# Patient Record
Sex: FEMALE | Race: BLACK OR AFRICAN AMERICAN | Employment: PART TIME | URBAN - METROPOLITAN AREA
[De-identification: names, ages, dates, MRNs, and addresses within clinical notes are randomized per-mention and may not be internally consistent; named-entity substitution may affect disease eponyms.]

---

## 2017-12-21 ENCOUNTER — OFFICE VISIT (OUTPATIENT)
Dept: INTERNAL MEDICINE CLINIC | Age: 57
End: 2017-12-21

## 2017-12-21 VITALS
TEMPERATURE: 98 F | OXYGEN SATURATION: 98 % | RESPIRATION RATE: 16 BRPM | HEART RATE: 84 BPM | WEIGHT: 210.8 LBS | DIASTOLIC BLOOD PRESSURE: 84 MMHG | HEIGHT: 65 IN | SYSTOLIC BLOOD PRESSURE: 118 MMHG | BODY MASS INDEX: 35.12 KG/M2

## 2017-12-21 DIAGNOSIS — Z00.00 ANNUAL PHYSICAL EXAM: Primary | ICD-10-CM

## 2017-12-21 DIAGNOSIS — M79.675 GREAT TOE PAIN, LEFT: ICD-10-CM

## 2017-12-21 DIAGNOSIS — Z12.11 COLON CANCER SCREENING: ICD-10-CM

## 2017-12-21 LAB
BILIRUBIN, POC: NORMAL
BLOOD URINE, POC: NORMAL
CLARITY, POC: CLEAR
COLOR, POC: YELLOW
GLUCOSE URINE, POC: NORMAL
KETONES, POC: NORMAL
LEUKOCYTE EST, POC: NORMAL
NITRITE, POC: NORMAL
PH, POC: 5.5
PROTEIN, POC: NORMAL
SPECIFIC GRAVITY, POC: 1.01
UROBILINOGEN, POC: 0.2

## 2017-12-21 PROCEDURE — 81002 URINALYSIS NONAUTO W/O SCOPE: CPT | Performed by: INTERNAL MEDICINE

## 2017-12-21 PROCEDURE — 99396 PREV VISIT EST AGE 40-64: CPT | Performed by: INTERNAL MEDICINE

## 2017-12-21 RX ORDER — NAPROXEN 500 MG/1
500 TABLET ORAL 2 TIMES DAILY WITH MEALS
COMMUNITY
End: 2020-10-22 | Stop reason: ALTCHOICE

## 2017-12-21 ASSESSMENT — ENCOUNTER SYMPTOMS
BACK PAIN: 1
CHOKING: 1
APNEA: 1
COUGH: 1

## 2017-12-21 ASSESSMENT — PATIENT HEALTH QUESTIONNAIRE - PHQ9
SUM OF ALL RESPONSES TO PHQ9 QUESTIONS 1 & 2: 0
SUM OF ALL RESPONSES TO PHQ QUESTIONS 1-9: 0
1. LITTLE INTEREST OR PLEASURE IN DOING THINGS: 0
2. FEELING DOWN, DEPRESSED OR HOPELESS: 0

## 2017-12-21 NOTE — PROGRESS NOTES
Jose Manuel Mcdaniels   YOB: 1960    Date of Visit:  12/21/2017    Chief Complaint   Patient presents with    Annual Exam       HPI  Pt presents for annual physical exam. Last pap 2016. Lisa 2-3 times per week  Yoga 2 times per week    Has allergies. Pt takes [address] gets lodged with eating x few years. Eggs, meats beef and chicken. Doesn't eat a lot of breads    Left great toe pain for a couple of years  Has a podiatry,   sharp pain no swelling    Elbow medial epicondyle recently dull achy and intermittent thinks from carrying purse      Review of Systems   HENT: Positive for congestion, postnasal drip and sneezing. Respiratory: Positive for apnea (has CPAP but hasn't used in 1 year), cough (with sinus drainage) and choking. Musculoskeletal: Positive for arthralgias (bilateral knee pain) and back pain (right low back pain ). Right low back pain x 2 years. Low back pain dull achy and intermittent. Yoga helps. Knee pain dull achy and sharp. Pt sees Orthopedics.         Past Medical History:   Diagnosis Date    Goiter     Right ankle effusion     2014    Visual impairment     wears glasses       Past Surgical History:   Procedure Laterality Date    ANKLE FUSION Right 2014    Jeyson West    OVARY SURGERY  2016    polyps - Maria R Wright MD       Outpatient Prescriptions Marked as Taking for the 12/21/17 encounter (Office Visit) with Aarti Lawrence MD   Medication Sig Dispense Refill    naproxen (NAPROSYN) 500 MG tablet Take 500 mg by mouth 2 times daily (with meals)      b complex vitamins capsule Take by mouth      Multiple Vitamin (MULTIVITAMIN) tablet Take 1 tablet by mouth           No Known Allergies    Social History   Substance Use Topics    Smoking status: Never Smoker    Smokeless tobacco: Never Used    Alcohol use 1.2 oz/week     2 Glasses of wine per week       Family History   Problem Relation Age of Onset    Diabetes Maternal Aunt     Diabetes Maternal Uncle     Diabetes Maternal Grandmother     Stroke Maternal Grandmother     Diabetes Maternal Grandfather     Heart Disease Maternal Grandfather     High Blood Pressure Maternal Grandfather     Diabetes Maternal Cousin     Cancer Neg Hx        Immunization History   Administered Date(s) Administered    Influenza Vaccine, unspecified formulation 10/17/2017    Tdap (Boostrix, Adacel) 11/22/2016       Vitals:    12/21/17 1526   BP: 118/84   Site: Right Arm   Position: Sitting   Cuff Size: Large Adult   Pulse: 84   Resp: 16   Temp: 98 °F (36.7 °C)   TempSrc: Oral   SpO2: 98%   Weight: 210 lb 12.8 oz (95.6 kg)   Height: 5' 5\" (1.651 m)     Body mass index is 35.08 kg/m². Physical Exam      Assessment/Plan     1. Annual physical exam    - POCT urinalysis dipstick  - CBC Auto Differential; Future  - Lipid Panel; Future  - TSH without Reflex; Future  - Comprehensive Metabolic Panel; Future    2. Colon cancer screening    - POCT Fecal Immunochemical Test (FIT); Future    3. Great toe pain, left    - Uric Acid; Future  - XR FOOT LEFT (MIN 3 VIEWS); Future      Discussed medications with patient, who voiced understanding of their use and indications. All questions answered.

## 2017-12-21 NOTE — PATIENT INSTRUCTIONS
-Fast 8-10 hours for labs  -Regular aerobic exercise  -Complete FIT testing for colon cancer screening and return  -Follow up with GI regarding dysphagia (difficulty swallowing)   -Left foot xray  -Follow up with Podiatry  -Tylenol 650mg 3 times daily as needed      Patient Education        Well Visit, Women 50 to 72: Care Instructions  Your Care Instructions    Physical exams can help you stay healthy. Your doctor has checked your overall health and may have suggested ways to take good care of yourself. He or she also may have recommended tests. At home, you can help prevent illness with healthy eating, regular exercise, and other steps. Follow-up care is a key part of your treatment and safety. Be sure to make and go to all appointments, and call your doctor if you are having problems. It's also a good idea to know your test results and keep a list of the medicines you take. How can you care for yourself at home? · Reach and stay at a healthy weight. This will lower your risk for many problems, such as obesity, diabetes, heart disease, and high blood pressure. · Get at least 30 minutes of exercise on most days of the week. Walking is a good choice. You also may want to do other activities, such as running, swimming, cycling, or playing tennis or team sports. · Do not smoke. Smoking can make health problems worse. If you need help quitting, talk to your doctor about stop-smoking programs and medicines. These can increase your chances of quitting for good. · Protect your skin from too much sun. When you're outdoors from 10 a.m. to 4 p.m., stay in the shade or cover up with clothing and a hat with a wide brim. Wear sunglasses that block UV rays. Even when it's cloudy, put broad-spectrum sunscreen (SPF 30 or higher) on any exposed skin. · See a dentist one or two times a year for checkups and to have your teeth cleaned. · Wear a seat belt in the car. · Limit alcohol to 1 drink a day.  Too much alcohol can disease. You may want to have this test before age 72. · Heart attack and stroke risk. At least every 4 to 6 years, you should have your risk for heart attack and stroke assessed. Your doctor uses factors such as your age, blood pressure, cholesterol, and whether you smoke or have diabetes to show what your risk for a heart attack or stroke is over the next 10 years. When should you call for help? Watch closely for changes in your health, and be sure to contact your doctor if you have any problems or symptoms that concern you. Where can you learn more? Go to https://CHNLpejose de jesuseweb.[employer]. org and sign in to your [employer] account. Enter U056 in the ???? box to learn more about \"Well Visit, Women 50 to 72: Care Instructions. \"     If you do not have an account, please click on the \"Sign Up Now\" link. Current as of: May 12, 2017  Content Version: 11.4  © 8781-4800 [employer]. Care instructions adapted under license by [facility] ([facility]). If you have questions about a medical condition or this instruction, always ask your healthcare professional. [place] [address] any warranty or liability for your use of this information. Patient Education        Well Visit, Women 48 to 72: Care Instructions  Your Care Instructions    Physical exams can help you stay healthy. Your doctor has checked your overall health and may have suggested ways to take good care of yourself. He or she also may have recommended tests. At home, you can help prevent illness with healthy eating, regular exercise, and other steps. Follow-up care is a key part of your treatment and safety. Be sure to make and go to all appointments, and call your doctor if you are having problems. It's also a good idea to know your test results and keep a list of the medicines you take. How can you care for yourself at home? · Reach and stay at a healthy weight.  This will lower your risk for many problems, such as obesity, diabetes, heart disease, and high blood pressure. · Get at least 30 minutes of exercise on most days of the week. Walking is a good choice. You also may want to do other activities, such as running, swimming, cycling, or playing tennis or team sports. · Do not smoke. Smoking can make health problems worse. If you need help quitting, talk to your doctor about stop-smoking programs and medicines. These can increase your chances of quitting for good. · Protect your skin from too much sun. When you're outdoors from 10 a.m. to 4 p.m., stay in the shade or cover up with clothing and a hat with a wide brim. Wear sunglasses that block UV rays. Even when it's cloudy, put broad-spectrum sunscreen (SPF 30 or higher) on any exposed skin. · See a dentist one or two times a year for checkups and to have your teeth cleaned. · Wear a seat belt in the car. · Limit alcohol to 1 drink a day. Too much alcohol can cause health problems. Follow your doctor's advice about when to have certain tests. These tests can spot problems early. · Cholesterol. Your doctor will tell you how often to have this done based on your age, family history, or other things that can increase your risk for heart attack and stroke. · Blood pressure. Have your blood pressure checked during a routine doctor visit. Your doctor will tell you how often to check your blood pressure based on your age, your blood pressure results, and other factors. · Mammogram. Ask your doctor how often you should have a mammogram, which is an X-ray of your breasts. A mammogram can spot breast cancer before it can be felt and when it is easiest to treat. · Pap test and pelvic exam. Ask your doctor how often you should have a Pap test. You may not need to have a Pap test as often as you used to. · Vision. Have your eyes checked every year or two or as often as your doctor suggests.  Some experts recommend that you have yearly exams for glaucoma and other medical condition or this instruction, always ask your healthcare professional. [place] [address] any warranty or liability for your use of this information.

## 2017-12-22 ENCOUNTER — NURSE ONLY (OUTPATIENT)
Dept: INTERNAL MEDICINE CLINIC | Age: 57
End: 2017-12-22

## 2017-12-22 ENCOUNTER — HOSPITAL ENCOUNTER (OUTPATIENT)
Dept: GENERAL RADIOLOGY | Age: 57
Discharge: HOME | End: 2017-12-22
Attending: INTERNAL MEDICINE | Admitting: INTERNAL MEDICINE

## 2017-12-22 DIAGNOSIS — M79.675 GREAT TOE PAIN, LEFT: ICD-10-CM

## 2017-12-22 DIAGNOSIS — Z00.00 ANNUAL PHYSICAL EXAM: ICD-10-CM

## 2017-12-22 DIAGNOSIS — Z12.11 COLON CANCER SCREENING: ICD-10-CM

## 2017-12-22 LAB
A/G RATIO: 1.8 (ref 1.1–2.2)
ALBUMIN SERPL-MCNC: 4.4 G/DL (ref 3.4–5)
ALP BLD-CCNC: 81 U/L (ref 40–129)
ALT SERPL-CCNC: 14 U/L (ref 10–40)
ANION GAP SERPL CALCULATED.3IONS-SCNC: 13 MMOL/L (ref 3–16)
AST SERPL-CCNC: 14 U/L (ref 15–37)
BASOPHILS ABSOLUTE: 0.1 K/UL (ref 0–0.2)
BASOPHILS RELATIVE PERCENT: 1.8 %
BILIRUB SERPL-MCNC: <0.2 MG/DL (ref 0–1)
BUN BLDV-MCNC: 13 MG/DL (ref 7–20)
CALCIUM SERPL-MCNC: 9.5 MG/DL (ref 8.3–10.6)
CHLORIDE BLD-SCNC: 106 MMOL/L (ref 99–110)
CHOLESTEROL, TOTAL: 207 MG/DL (ref 0–199)
CO2: 25 MMOL/L (ref 21–32)
CONTROL: NEGATIVE
CREAT SERPL-MCNC: 0.6 MG/DL (ref 0.6–1.1)
EOSINOPHILS ABSOLUTE: 0.4 K/UL (ref 0–0.6)
EOSINOPHILS RELATIVE PERCENT: 10.9 %
GFR AFRICAN AMERICAN: >60
GFR NON-AFRICAN AMERICAN: >60
GLOBULIN: 2.4 G/DL
GLUCOSE BLD-MCNC: 97 MG/DL (ref 70–99)
HCT VFR BLD CALC: 43.4 % (ref 36–48)
HDLC SERPL-MCNC: 95 MG/DL (ref 40–60)
HEMOCCULT STL QL: NEGATIVE
HEMOGLOBIN: 14.2 G/DL (ref 12–16)
LDL CHOLESTEROL CALCULATED: 98 MG/DL
LYMPHOCYTES ABSOLUTE: 1.4 K/UL (ref 1–5.1)
LYMPHOCYTES RELATIVE PERCENT: 34.6 %
MCH RBC QN AUTO: 32.4 PG (ref 26–34)
MCHC RBC AUTO-ENTMCNC: 32.8 G/DL (ref 31–36)
MCV RBC AUTO: 98.6 FL (ref 80–100)
MONOCYTES ABSOLUTE: 0.2 K/UL (ref 0–1.3)
MONOCYTES RELATIVE PERCENT: 5.8 %
NEUTROPHILS ABSOLUTE: 1.9 K/UL (ref 1.7–7.7)
NEUTROPHILS RELATIVE PERCENT: 46.9 %
PDW BLD-RTO: 13.8 % (ref 12.4–15.4)
PLATELET # BLD: 257 K/UL (ref 135–450)
PMV BLD AUTO: 9.6 FL (ref 5–10.5)
POTASSIUM SERPL-SCNC: 4.7 MMOL/L (ref 3.5–5.1)
RBC # BLD: 4.4 M/UL (ref 4–5.2)
SODIUM BLD-SCNC: 144 MMOL/L (ref 136–145)
TOTAL PROTEIN: 6.8 G/DL (ref 6.4–8.2)
TRIGL SERPL-MCNC: 70 MG/DL (ref 0–150)
TSH SERPL DL<=0.05 MIU/L-ACNC: 1.12 UIU/ML (ref 0.27–4.2)
URIC ACID, SERUM: 4.9 MG/DL (ref 2.6–6)
VLDLC SERPL CALC-MCNC: 14 MG/DL
WBC # BLD: 4.1 K/UL (ref 4–11)

## 2017-12-22 PROCEDURE — 82274 ASSAY TEST FOR BLOOD FECAL: CPT | Performed by: INTERNAL MEDICINE

## 2018-12-31 ENCOUNTER — OFFICE VISIT (OUTPATIENT)
Dept: INTERNAL MEDICINE CLINIC | Age: 58
End: 2018-12-31
Payer: COMMERCIAL

## 2018-12-31 VITALS
OXYGEN SATURATION: 97 % | DIASTOLIC BLOOD PRESSURE: 86 MMHG | WEIGHT: 217 LBS | RESPIRATION RATE: 14 BRPM | HEIGHT: 65 IN | HEART RATE: 91 BPM | SYSTOLIC BLOOD PRESSURE: 122 MMHG | TEMPERATURE: 98 F | BODY MASS INDEX: 36.15 KG/M2

## 2018-12-31 DIAGNOSIS — Z23 NEED FOR SHINGLES VACCINE: ICD-10-CM

## 2018-12-31 DIAGNOSIS — J30.9 ALLERGIC RHINITIS, UNSPECIFIED SEASONALITY, UNSPECIFIED TRIGGER: ICD-10-CM

## 2018-12-31 DIAGNOSIS — Z00.00 ANNUAL PHYSICAL EXAM: Primary | ICD-10-CM

## 2018-12-31 DIAGNOSIS — E66.9 OBESITY (BMI 35.0-39.9 WITHOUT COMORBIDITY): ICD-10-CM

## 2018-12-31 LAB
BILIRUBIN, POC: NORMAL
BLOOD URINE, POC: NORMAL
CLARITY, POC: CLEAR
COLOR, POC: YELLOW
GLUCOSE URINE, POC: NORMAL
KETONES, POC: NORMAL
LEUKOCYTE EST, POC: NORMAL
NITRITE, POC: NORMAL
PH, POC: 5
PROTEIN, POC: NORMAL
SPECIFIC GRAVITY, POC: 1.02
UROBILINOGEN, POC: 0.2

## 2018-12-31 PROCEDURE — 99396 PREV VISIT EST AGE 40-64: CPT | Performed by: INTERNAL MEDICINE

## 2018-12-31 PROCEDURE — 81002 URINALYSIS NONAUTO W/O SCOPE: CPT | Performed by: INTERNAL MEDICINE

## 2018-12-31 ASSESSMENT — PATIENT HEALTH QUESTIONNAIRE - PHQ9
2. FEELING DOWN, DEPRESSED OR HOPELESS: 0
SUM OF ALL RESPONSES TO PHQ QUESTIONS 1-9: 0
SUM OF ALL RESPONSES TO PHQ9 QUESTIONS 1 & 2: 0
1. LITTLE INTEREST OR PLEASURE IN DOING THINGS: 0
SUM OF ALL RESPONSES TO PHQ QUESTIONS 1-9: 0

## 2018-12-31 NOTE — PROGRESS NOTES
Stroke Maternal Grandmother     Diabetes Maternal Grandfather     Heart Disease Maternal Grandfather     High Blood Pressure Maternal Grandfather     Diabetes Maternal Cousin     Cancer Neg Hx        Immunization History   Administered Date(s) Administered    Influenza Vaccine, unspecified formulation 10/17/2017    Influenza Virus Vaccine 10/05/2018    Tdap (Boostrix, Adacel) 11/22/2016       Vitals:    12/31/18 1302   BP: 122/86   Site: Right Upper Arm   Position: Sitting   Cuff Size: Large Adult   Pulse: 91   Resp: 14   Temp: 98 °F (36.7 °C)   TempSrc: Oral   SpO2: 97%   Weight: 217 lb (98.4 kg)   Height: 5' 5.25\" (1.657 m)     Body mass index is 35.83 kg/m². Physical Exam   Constitutional: She is oriented to person, place, and time. She appears well-developed and well-nourished. No distress. HENT:   Head: Normocephalic and atraumatic. Right Ear: Hearing, tympanic membrane and ear canal normal.   Left Ear: Hearing, tympanic membrane and ear canal normal.   Nose: Nose normal.   Mouth/Throat: Uvula is midline, oropharynx is clear and moist and mucous membranes are normal.   Eyes: Pupils are equal, round, and reactive to light. Conjunctivae, EOM and lids are normal.   Neck: Neck supple. Carotid bruit is not present. No thyromegaly present. Cardiovascular: Normal rate, regular rhythm, S1 normal, S2 normal, normal heart sounds and intact distal pulses. Exam reveals no gallop and no friction rub. No murmur heard. Pulmonary/Chest: Effort normal and breath sounds normal. No respiratory distress. She has no wheezes. She has no rhonchi. She has no rales. Abdominal: Soft. Bowel sounds are normal. She exhibits no distension and no mass. There is no hepatosplenomegaly. There is no tenderness. There is no rebound. Genitourinary:   Genitourinary Comments: deferred   Musculoskeletal: Normal range of motion. She exhibits no edema.         Right shoulder: She exhibits normal range of motion and no

## 2018-12-31 NOTE — PATIENT INSTRUCTIONS
early.  · Cholesterol. Your doctor will tell you how often to have this done based on your age, family history, or other things that can increase your risk for heart attack and stroke. · Blood pressure. Have your blood pressure checked during a routine doctor visit. Your doctor will tell you how often to check your blood pressure based on your age, your blood pressure results, and other factors. · Mammogram. Ask your doctor how often you should have a mammogram, which is an X-ray of your breasts. A mammogram can spot breast cancer before it can be felt and when it is easiest to treat. · Pap test and pelvic exam. Ask your doctor how often you should have a Pap test. You may not need to have a Pap test as often as you used to. · Vision. Have your eyes checked every year or two or as often as your doctor suggests. Some experts recommend that you have yearly exams for glaucoma and other age-related eye problems starting at age 48. · Hearing. Tell your doctor if you notice any change in your hearing. You can have tests to find out how well you hear. · Diabetes. Ask your doctor whether you should have tests for diabetes. · Colon cancer. You should begin tests for colon cancer at age 48. You may have one of several tests. Your doctor will tell you how often to have tests based on your age and risk. Risks include whether you already had a precancerous polyp removed from your colon or whether your parents, sisters and brothers, or children have had colon cancer. · Thyroid disease. Talk to your doctor about whether to have your thyroid checked as part of a regular physical exam. Women have an increased chance of a thyroid problem. · Osteoporosis. You should begin tests for bone density at age 72. If you are younger than 72, ask your doctor whether you have factors that may increase your risk for this disease. You may want to have this test before age 72. · Heart attack and stroke risk.  At least every 4 to 6 years, professional. [address] any warranty or liability for your use of this information.

## 2019-01-15 ENCOUNTER — TELEPHONE (OUTPATIENT)
Dept: BARIATRICS/WEIGHT MGMT | Age: 59
End: 2019-01-15

## 2019-03-18 ENCOUNTER — NURSE ONLY (OUTPATIENT)
Dept: INTERNAL MEDICINE CLINIC | Age: 59
End: 2019-03-18
Payer: COMMERCIAL

## 2019-03-18 DIAGNOSIS — Z11.1 PPD SCREENING TEST: Primary | ICD-10-CM

## 2019-03-18 PROCEDURE — 86580 TB INTRADERMAL TEST: CPT | Performed by: INTERNAL MEDICINE

## 2019-03-20 LAB
INDURATION: NORMAL
TB SKIN TEST: NEGATIVE

## 2019-12-23 ENCOUNTER — OFFICE VISIT (OUTPATIENT)
Dept: PRIMARY CARE CLINIC | Age: 59
End: 2019-12-23
Payer: COMMERCIAL

## 2019-12-23 VITALS
TEMPERATURE: 98.1 F | HEART RATE: 68 BPM | OXYGEN SATURATION: 99 % | BODY MASS INDEX: 38.2 KG/M2 | SYSTOLIC BLOOD PRESSURE: 120 MMHG | DIASTOLIC BLOOD PRESSURE: 78 MMHG | RESPIRATION RATE: 14 BRPM | WEIGHT: 215.6 LBS | HEIGHT: 63 IN

## 2019-12-23 DIAGNOSIS — R09.82 POSTNASAL DRIP: ICD-10-CM

## 2019-12-23 DIAGNOSIS — Z00.00 ANNUAL PHYSICAL EXAM: ICD-10-CM

## 2019-12-23 DIAGNOSIS — Z23 NEED FOR INFLUENZA VACCINATION: ICD-10-CM

## 2019-12-23 DIAGNOSIS — R05.9 COUGH: ICD-10-CM

## 2019-12-23 DIAGNOSIS — Z00.00 ANNUAL PHYSICAL EXAM: Primary | ICD-10-CM

## 2019-12-23 LAB
A/G RATIO: 1.8 (ref 1.1–2.2)
ALBUMIN SERPL-MCNC: 4.4 G/DL (ref 3.4–5)
ALP BLD-CCNC: 80 U/L (ref 40–129)
ALT SERPL-CCNC: 13 U/L (ref 10–40)
ANION GAP SERPL CALCULATED.3IONS-SCNC: 12 MMOL/L (ref 3–16)
AST SERPL-CCNC: 14 U/L (ref 15–37)
BASOPHILS ABSOLUTE: 0.1 K/UL (ref 0–0.2)
BASOPHILS RELATIVE PERCENT: 1.4 %
BILIRUB SERPL-MCNC: 0.3 MG/DL (ref 0–1)
BILIRUBIN URINE: NEGATIVE
BLOOD, URINE: NEGATIVE
BUN BLDV-MCNC: 11 MG/DL (ref 7–20)
CALCIUM SERPL-MCNC: 9.7 MG/DL (ref 8.3–10.6)
CHLORIDE BLD-SCNC: 102 MMOL/L (ref 99–110)
CHOLESTEROL, TOTAL: 216 MG/DL (ref 0–199)
CLARITY: CLEAR
CO2: 28 MMOL/L (ref 21–32)
COLOR: YELLOW
CREAT SERPL-MCNC: 0.7 MG/DL (ref 0.6–1.1)
EOSINOPHILS ABSOLUTE: 0.3 K/UL (ref 0–0.6)
EOSINOPHILS RELATIVE PERCENT: 6.3 %
GFR AFRICAN AMERICAN: >60
GFR NON-AFRICAN AMERICAN: >60
GLOBULIN: 2.4 G/DL
GLUCOSE BLD-MCNC: 94 MG/DL (ref 70–99)
GLUCOSE URINE: NEGATIVE MG/DL
HCT VFR BLD CALC: 42.5 % (ref 36–48)
HDLC SERPL-MCNC: 85 MG/DL (ref 40–60)
HEMOGLOBIN: 14 G/DL (ref 12–16)
KETONES, URINE: NEGATIVE MG/DL
LDL CHOLESTEROL CALCULATED: 117 MG/DL
LEUKOCYTE ESTERASE, URINE: NEGATIVE
LYMPHOCYTES ABSOLUTE: 1.7 K/UL (ref 1–5.1)
LYMPHOCYTES RELATIVE PERCENT: 40.2 %
MCH RBC QN AUTO: 31.7 PG (ref 26–34)
MCHC RBC AUTO-ENTMCNC: 33 G/DL (ref 31–36)
MCV RBC AUTO: 96.2 FL (ref 80–100)
MICROSCOPIC EXAMINATION: NORMAL
MONOCYTES ABSOLUTE: 0.3 K/UL (ref 0–1.3)
MONOCYTES RELATIVE PERCENT: 7.6 %
NEUTROPHILS ABSOLUTE: 1.9 K/UL (ref 1.7–7.7)
NEUTROPHILS RELATIVE PERCENT: 44.5 %
NITRITE, URINE: NEGATIVE
PDW BLD-RTO: 13.2 % (ref 12.4–15.4)
PH UA: 5 (ref 5–8)
PLATELET # BLD: 254 K/UL (ref 135–450)
PMV BLD AUTO: 9.3 FL (ref 5–10.5)
POTASSIUM SERPL-SCNC: 3.9 MMOL/L (ref 3.5–5.1)
PROTEIN UA: NEGATIVE MG/DL
RBC # BLD: 4.42 M/UL (ref 4–5.2)
SODIUM BLD-SCNC: 142 MMOL/L (ref 136–145)
SPECIFIC GRAVITY UA: 1.01 (ref 1–1.03)
TOTAL PROTEIN: 6.8 G/DL (ref 6.4–8.2)
TRIGL SERPL-MCNC: 69 MG/DL (ref 0–150)
TSH SERPL DL<=0.05 MIU/L-ACNC: 1.14 UIU/ML (ref 0.27–4.2)
URINE TYPE: NORMAL
UROBILINOGEN, URINE: 0.2 E.U./DL
VLDLC SERPL CALC-MCNC: 14 MG/DL
WBC # BLD: 4.3 K/UL (ref 4–11)

## 2019-12-23 PROCEDURE — 81003 URINALYSIS AUTO W/O SCOPE: CPT | Performed by: INTERNAL MEDICINE

## 2019-12-23 PROCEDURE — 90471 IMMUNIZATION ADMIN: CPT | Performed by: INTERNAL MEDICINE

## 2019-12-23 PROCEDURE — 99396 PREV VISIT EST AGE 40-64: CPT | Performed by: INTERNAL MEDICINE

## 2019-12-23 PROCEDURE — 90686 IIV4 VACC NO PRSV 0.5 ML IM: CPT | Performed by: INTERNAL MEDICINE

## 2019-12-23 PROCEDURE — G8482 FLU IMMUNIZE ORDER/ADMIN: HCPCS | Performed by: INTERNAL MEDICINE

## 2019-12-23 RX ORDER — BENZONATATE 100 MG/1
100 CAPSULE ORAL 3 TIMES DAILY PRN
Qty: 30 CAPSULE | Refills: 0 | Status: SHIPPED | OUTPATIENT
Start: 2019-12-23 | End: 2019-12-30

## 2019-12-23 RX ORDER — LORATADINE 10 MG/1
10 TABLET ORAL DAILY
Qty: 30 TABLET | Refills: 1 | Status: SHIPPED | OUTPATIENT
Start: 2019-12-23 | End: 2020-02-17 | Stop reason: ALTCHOICE

## 2019-12-23 ASSESSMENT — ENCOUNTER SYMPTOMS
PHOTOPHOBIA: 0
CONSTIPATION: 0
WHEEZING: 0
SINUS PAIN: 0
SORE THROAT: 0
RHINORRHEA: 0
VOICE CHANGE: 0
NAUSEA: 0
DIARRHEA: 0
COUGH: 1
EYE ITCHING: 0
SHORTNESS OF BREATH: 0
FACIAL SWELLING: 0
BLOOD IN STOOL: 0
EYE DISCHARGE: 0
ABDOMINAL DISTENTION: 0
VOMITING: 0
BACK PAIN: 0
EYE PAIN: 0
APNEA: 0
ABDOMINAL PAIN: 0
TROUBLE SWALLOWING: 0
EYE REDNESS: 0
ANAL BLEEDING: 0
RECTAL PAIN: 0
SINUS PRESSURE: 0
CHOKING: 0
CHEST TIGHTNESS: 0

## 2019-12-23 ASSESSMENT — PATIENT HEALTH QUESTIONNAIRE - PHQ9
1. LITTLE INTEREST OR PLEASURE IN DOING THINGS: 0
2. FEELING DOWN, DEPRESSED OR HOPELESS: 0
SUM OF ALL RESPONSES TO PHQ QUESTIONS 1-9: 0
SUM OF ALL RESPONSES TO PHQ QUESTIONS 1-9: 0
SUM OF ALL RESPONSES TO PHQ9 QUESTIONS 1 & 2: 0

## 2019-12-30 ENCOUNTER — TELEPHONE (OUTPATIENT)
Dept: PRIMARY CARE CLINIC | Age: 59
End: 2019-12-30

## 2019-12-30 NOTE — TELEPHONE ENCOUNTER
Patient is currently scheduled on 01/10/2020 for her follow up appointment on congestion and cough. Patient stated the symptoms have gotten worse since she was in the office on 12/23/2019.  Would like to know if Dr. Chanelle Moseley would like to see her sooner or can a medication be prescribed to take before the follow up appointment

## 2020-01-10 ENCOUNTER — OFFICE VISIT (OUTPATIENT)
Dept: PRIMARY CARE CLINIC | Age: 60
End: 2020-01-10
Payer: COMMERCIAL

## 2020-01-10 ENCOUNTER — HOSPITAL ENCOUNTER (OUTPATIENT)
Dept: GENERAL RADIOLOGY | Age: 60
Discharge: HOME | End: 2020-01-10
Payer: COMMERCIAL

## 2020-01-10 ENCOUNTER — HOSPITAL ENCOUNTER (OUTPATIENT)
Age: 60
Discharge: HOME | End: 2020-01-10
Payer: COMMERCIAL

## 2020-01-10 VITALS
WEIGHT: 215 LBS | TEMPERATURE: 98.4 F | SYSTOLIC BLOOD PRESSURE: 122 MMHG | BODY MASS INDEX: 35.82 KG/M2 | OXYGEN SATURATION: 98 % | HEIGHT: 65 IN | HEART RATE: 76 BPM | DIASTOLIC BLOOD PRESSURE: 78 MMHG

## 2020-01-10 PROCEDURE — G8482 FLU IMMUNIZE ORDER/ADMIN: HCPCS | Performed by: INTERNAL MEDICINE

## 2020-01-10 PROCEDURE — 3017F COLORECTAL CA SCREEN DOC REV: CPT | Performed by: INTERNAL MEDICINE

## 2020-01-10 PROCEDURE — 1036F TOBACCO NON-USER: CPT | Performed by: INTERNAL MEDICINE

## 2020-01-10 PROCEDURE — 71046 X-RAY EXAM CHEST 2 VIEWS: CPT

## 2020-01-10 PROCEDURE — 99213 OFFICE O/P EST LOW 20 MIN: CPT | Performed by: INTERNAL MEDICINE

## 2020-01-10 PROCEDURE — G8427 DOCREV CUR MEDS BY ELIG CLIN: HCPCS | Performed by: INTERNAL MEDICINE

## 2020-01-10 PROCEDURE — G8417 CALC BMI ABV UP PARAM F/U: HCPCS | Performed by: INTERNAL MEDICINE

## 2020-01-10 RX ORDER — OMEPRAZOLE 20 MG/1
20 CAPSULE, DELAYED RELEASE ORAL
Qty: 30 CAPSULE | Refills: 1 | Status: SHIPPED | OUTPATIENT
Start: 2020-01-10 | End: 2020-02-17 | Stop reason: SDUPTHER

## 2020-01-10 RX ORDER — AZITHROMYCIN 250 MG/1
TABLET, FILM COATED ORAL
Qty: 1 PACKET | Refills: 0 | Status: SHIPPED | OUTPATIENT
Start: 2020-01-10 | End: 2020-01-20

## 2020-01-10 RX ORDER — BROMPHENIRAMINE MALEATE, PSEUDOEPHEDRINE HYDROCHLORIDE, AND DEXTROMETHORPHAN HYDROBROMIDE 2; 30; 10 MG/5ML; MG/5ML; MG/5ML
5 SYRUP ORAL 4 TIMES DAILY PRN
Qty: 120 ML | Refills: 0 | Status: SHIPPED | OUTPATIENT
Start: 2020-01-10 | End: 2020-02-17 | Stop reason: ALTCHOICE

## 2020-01-10 RX ORDER — ALBUTEROL SULFATE 90 UG/1
2 AEROSOL, METERED RESPIRATORY (INHALATION) EVERY 6 HOURS PRN
Qty: 1 INHALER | Refills: 0 | Status: SHIPPED | OUTPATIENT
Start: 2020-01-10 | End: 2020-02-17 | Stop reason: ALTCHOICE

## 2020-01-10 ASSESSMENT — ENCOUNTER SYMPTOMS
COUGH: 1
SHORTNESS OF BREATH: 1
BLOOD IN STOOL: 0
NAUSEA: 0
SINUS PAIN: 0
VOMITING: 0
ABDOMINAL PAIN: 0
WHEEZING: 1
CHEST TIGHTNESS: 0
TROUBLE SWALLOWING: 0
SINUS PRESSURE: 0
RHINORRHEA: 0
SORE THROAT: 0

## 2020-01-10 ASSESSMENT — PATIENT HEALTH QUESTIONNAIRE - PHQ9
SUM OF ALL RESPONSES TO PHQ9 QUESTIONS 1 & 2: 0
SUM OF ALL RESPONSES TO PHQ QUESTIONS 1-9: 0
SUM OF ALL RESPONSES TO PHQ QUESTIONS 1-9: 0
1. LITTLE INTEREST OR PLEASURE IN DOING THINGS: 0
2. FEELING DOWN, DEPRESSED OR HOPELESS: 0

## 2020-01-10 NOTE — PATIENT INSTRUCTIONS
symptoms. Follow-up care is a key part of your treatment and safety. Be sure to make and go to all appointments, and call your doctor if you are having problems. It's also a good idea to know your test results and keep a list of the medicines you take. How can you care for yourself at home? · Take your medicines exactly as prescribed. Call your doctor if you think you are having a problem with your medicine. · Your doctor may recommend over-the-counter medicine. For mild or occasional indigestion, antacids, such as Tums, Gaviscon, Mylanta, or Maalox, may help. Your doctor also may recommend over-the-counter acid reducers, such as Pepcid AC, Tagamet HB, Zantac 75, or Prilosec. Read and follow all instructions on the label. If you use these medicines often, talk with your doctor. · Change your eating habits. ? It's best to eat several small meals instead of two or three large meals. ? After you eat, wait 2 to 3 hours before you lie down. ? Chocolate, mint, and alcohol can make GERD worse. ? Spicy foods, foods that have a lot of acid (like tomatoes and oranges), and coffee can make GERD symptoms worse in some people. If your symptoms are worse after you eat a certain food, you may want to stop eating that food to see if your symptoms get better. · Do not smoke or chew tobacco. Smoking can make GERD worse. If you need help quitting, talk to your doctor about stop-smoking programs and medicines. These can increase your chances of quitting for good. · If you have GERD symptoms at night, raise the head of your bed 6 to 8 inches by putting the frame on blocks or placing a foam wedge under the head of your mattress. (Adding extra pillows does not work.)  · Do not wear tight clothing around your middle. · Lose weight if you need to. Losing just 5 to 10 pounds can help. When should you call for help?   Call your doctor now or seek immediate medical care if:    · You have new or different belly pain.     · Your stools are black and tarlike or have streaks of blood.    Watch closely for changes in your health, and be sure to contact your doctor if:    · Your symptoms have not improved after 2 days.     · Food seems to catch in your throat or chest.   Where can you learn more? Go to https://chpepiceweb.imbookin ([employer]). org and sign in to your [employer] account. Enter P318 in the [employer] box to learn more about \"Gastroesophageal Reflux Disease (GERD): Care Instructions. \"     If you do not have an account, please click on the \"Sign Up Now\" link. Current as of: August 11, 2019  Content Version: 12.3  © 3981-5350 [employer]. Care instructions adapted under license by [facility] [address] If you have questions about a medical condition or this instruction, always ask your healthcare professional. [address] any warranty or liability for your use of this information.

## 2020-01-10 NOTE — PROGRESS NOTES
John Lau   Date ofBirth:  1960    Date of Visit:  1/10/2020    Chief Complaint   Patient presents with    Cough    Congestion       HPI  Pt c/o 2 week+ h/o productive cough with green phlegm that clearer, coughing spells, post nasal drainage, stuffy nose, little wheezing, SOB, headache, and ears popping. Pt states Tessalon Perles and Loratadine did not help. Pt c/o heartburn and reflux x 2 years. Pt is not taking any medication. Pt states she has cut out spicy foods, tomato type dishes,   Drinks 2 cups of coffee in the morning. Pt has heartburn and reflux recently. Review of Systems   Constitutional: Negative for chills and fever. HENT: Positive for congestion and postnasal drip. Negative for ear pain, rhinorrhea, sinus pressure, sinus pain, sneezing, sore throat and trouble swallowing. Respiratory: Positive for cough, shortness of breath and wheezing. Negative for chest tightness. Cardiovascular: Negative for chest pain. Gastrointestinal: Negative for abdominal pain, blood in stool, nausea and vomiting. Neurological: Positive for headaches.        No Known Allergies  Outpatient Medications Marked as Taking for the 1/10/20 encounter (Office Visit) with Getachew Fowler MD   Medication Sig Dispense Refill    loratadine (CLARITIN) 10 MG tablet Take 1 tablet by mouth daily 30 tablet 1    zoster recombinant adjuvanted vaccine [facility]) 50 MCG/0.5ML SUSR injection Inject 0.5 mLs into the muscle See Admin Instructions 1 dose now and repeat in 2-6 months 0.5 mL 1    naproxen (NAPROSYN) 500 MG tablet Take 500 mg by mouth 2 times daily (with meals)      b complex vitamins capsule Take by mouth      vitamin D 1000 UNITS CAPS Take by mouth      fexofenadine (ALLEGRA) 180 MG tablet Take 180 mg by mouth      Multiple Vitamin (MULTIVITAMIN) tablet Take 1 tablet by mouth           Vitals:    01/10/20 0808   BP: 122/78   Site: Right Upper Arm   Position: Sitting   Cuff Size: Medium Adult 2. 4     WBC 12/23/2019 4.3     RBC 12/23/2019 4.42     Hemoglobin 12/23/2019 14.0     Hematocrit 12/23/2019 42.5     MCV 12/23/2019 96.2     MCH 12/23/2019 31.7     MCHC 12/23/2019 33.0     RDW 12/23/2019 13.2     Platelets 01/21/2942 254     MPV 12/23/2019 9.3     Neutrophils % 12/23/2019 44.5     Lymphocytes % 12/23/2019 40.2     Monocytes % 12/23/2019 7.6     Eosinophils % 12/23/2019 6.3     Basophils % 12/23/2019 1.4     Neutrophils Absolute 12/23/2019 1.9     Lymphocytes Absolute 12/23/2019 1.7     Monocytes Absolute 12/23/2019 0.3     Eosinophils Absolute 12/23/2019 0.3     Basophils Absolute 12/23/2019 0.1    Office Visit on 12/23/2019   Component Date Value    Color, UA 12/23/2019 YELLOW     Clarity, UA 12/23/2019 Clear     Glucose, Ur 12/23/2019 Negative     Bilirubin Urine 12/23/2019 Negative     Ketones, Urine 12/23/2019 Negative     Specific Gravity, UA 12/23/2019 1.010     Blood, Urine 12/23/2019 Negative     pH, UA 12/23/2019 5.0     Protein, UA 12/23/2019 Negative     Urobilinogen, Urine 12/23/2019 0.2     Nitrite, Urine 12/23/2019 Negative     Leukocyte Esterase, Urine 12/23/2019 Negative     Microscopic Examination 12/23/2019 Not Indicated     Urine Type 12/23/2019 Cleancatch          Assessment/Plan     1. Upper respiratory tract infection, unspecified type  - azithromycin (ZITHROMAX) 250 MG tablet; Take 2 tabs (500 mg) on Day 1, and take 1 tab (250 mg) on days 2 through 5. Dispense: 1 packet; Refill: 0  - brompheniramine-pseudoephedrine-DM (BROMFED DM) 2-30-10 MG/5ML syrup; Take 5 mLs by mouth 4 times daily as needed for Congestion or Cough  Dispense: 120 mL; Refill: 0    2. Bronchitis  - azithromycin (ZITHROMAX) 250 MG tablet; Take 2 tabs (500 mg) on Day 1, and take 1 tab (250 mg) on days 2 through 5. Dispense: 1 packet; Refill: 0  - brompheniramine-pseudoephedrine-DM (BROMFED DM) 2-30-10 MG/5ML syrup;  Take 5 mLs by mouth 4 times daily as needed for Congestion or Cough  Dispense: 120 mL; Refill: 0  - albuterol sulfate HFA (PROAIR HFA) 108 (90 Base) MCG/ACT inhaler; Inhale 2 puffs into the lungs every 6 hours as needed for Wheezing  Dispense: 1 Inhaler; Refill: 0  - XR CHEST STANDARD (2 VW); Future    3. Gastroesophageal reflux disease, esophagitis presence not specified  - omeprazole (PRILOSEC) 20 MG delayed release capsule; Take 1 capsule by mouth every morning (before breakfast)  Dispense: 30 capsule; Refill: 1  -Decrease caffeine, avoid spicy foods, avoid tomato based foods  -Eat small meals instead of large meals  -Wait 2-3 hours after eating before lying down        Discussed medications with patient, who voiced understanding of their use and indications. All questions answered. Return in about 4 weeks (around 2/7/2020) for cough and GERD.

## 2020-01-22 PROBLEM — R05.9 COUGH: Status: RESOLVED | Noted: 2019-12-23 | Resolved: 2020-01-22

## 2020-02-17 ENCOUNTER — OFFICE VISIT (OUTPATIENT)
Dept: PRIMARY CARE CLINIC | Age: 60
End: 2020-02-17
Payer: COMMERCIAL

## 2020-02-17 VITALS
DIASTOLIC BLOOD PRESSURE: 80 MMHG | SYSTOLIC BLOOD PRESSURE: 136 MMHG | HEART RATE: 59 BPM | WEIGHT: 213.6 LBS | BODY MASS INDEX: 35.59 KG/M2 | OXYGEN SATURATION: 99 % | HEIGHT: 65 IN

## 2020-02-17 PROCEDURE — 1036F TOBACCO NON-USER: CPT | Performed by: INTERNAL MEDICINE

## 2020-02-17 PROCEDURE — G8482 FLU IMMUNIZE ORDER/ADMIN: HCPCS | Performed by: INTERNAL MEDICINE

## 2020-02-17 PROCEDURE — G8417 CALC BMI ABV UP PARAM F/U: HCPCS | Performed by: INTERNAL MEDICINE

## 2020-02-17 PROCEDURE — 99213 OFFICE O/P EST LOW 20 MIN: CPT | Performed by: INTERNAL MEDICINE

## 2020-02-17 PROCEDURE — G8427 DOCREV CUR MEDS BY ELIG CLIN: HCPCS | Performed by: INTERNAL MEDICINE

## 2020-02-17 PROCEDURE — 3017F COLORECTAL CA SCREEN DOC REV: CPT | Performed by: INTERNAL MEDICINE

## 2020-02-17 RX ORDER — OMEPRAZOLE 20 MG/1
20 CAPSULE, DELAYED RELEASE ORAL
Qty: 30 CAPSULE | Refills: 5 | Status: SHIPPED | OUTPATIENT
Start: 2020-02-17 | End: 2020-10-22 | Stop reason: ALTCHOICE

## 2020-02-17 SDOH — ECONOMIC STABILITY: FOOD INSECURITY: WITHIN THE PAST 12 MONTHS, THE FOOD YOU BOUGHT JUST DIDN'T LAST AND YOU DIDN'T HAVE MONEY TO GET MORE.: NEVER TRUE

## 2020-02-17 SDOH — ECONOMIC STABILITY: FOOD INSECURITY: WITHIN THE PAST 12 MONTHS, YOU WORRIED THAT YOUR FOOD WOULD RUN OUT BEFORE YOU GOT MONEY TO BUY MORE.: NEVER TRUE

## 2020-02-17 SDOH — ECONOMIC STABILITY: TRANSPORTATION INSECURITY
IN THE PAST 12 MONTHS, HAS THE LACK OF TRANSPORTATION KEPT YOU FROM MEDICAL APPOINTMENTS OR FROM GETTING MEDICATIONS?: NO

## 2020-02-17 SDOH — ECONOMIC STABILITY: INCOME INSECURITY: HOW HARD IS IT FOR YOU TO PAY FOR THE VERY BASICS LIKE FOOD, HOUSING, MEDICAL CARE, AND HEATING?: NOT HARD AT ALL

## 2020-02-17 SDOH — ECONOMIC STABILITY: TRANSPORTATION INSECURITY
IN THE PAST 12 MONTHS, HAS LACK OF TRANSPORTATION KEPT YOU FROM MEETINGS, WORK, OR FROM GETTING THINGS NEEDED FOR DAILY LIVING?: NO

## 2020-02-17 NOTE — PROGRESS NOTES
Valerie Dixon   Date ofBirth:  1960    Date of Visit:  2/17/2020    Chief Complaint   Patient presents with    Cough    Gastroesophageal Reflux       HPI  GERD- Pt states Omeprazole helps reflux. Pt denies heartburn and reflux. Pt decreases spicy foods and tomato based foods. Pt drinks up to 2 cups of coffee max per day. Cough- Pt states she clears throat a lot. Pt states she has a tickle causing cough from throat. Pt states she carries cough drops and drinks a lot of water. Review of Systems   Constitutional: Negative for chills and fever. HENT: Negative for congestion, ear pain, postnasal drip, rhinorrhea, sneezing, sore throat and trouble swallowing. Respiratory: Positive for cough. Negative for chest tightness, shortness of breath and wheezing. Cardiovascular: Negative for chest pain. Gastrointestinal: Negative for abdominal pain, blood in stool, nausea and vomiting. Neurological: Negative for headaches. No Known Allergies  Outpatient Medications Marked as Taking for the 2/17/20 encounter (Office Visit) with Chema Gutierrez MD   Medication Sig Dispense Refill    omeprazole (PRILOSEC) 20 MG delayed release capsule Take 1 capsule by mouth every morning (before breakfast) 30 capsule 1    zoster recombinant adjuvanted vaccine (SHINGRIX) 50 MCG/0.5ML SUSR injection Inject 0.5 mLs into the muscle See Admin Instructions 1 dose now and repeat in 2-6 months 0.5 mL 1    naproxen (NAPROSYN) 500 MG tablet Take 500 mg by mouth 2 times daily (with meals)      b complex vitamins capsule Take by mouth      vitamin D 1000 UNITS CAPS Take by mouth      Multiple Vitamin (MULTIVITAMIN) tablet Take 1 tablet by mouth           Vitals:    02/17/20 1519   BP: 136/80   Site: Right Upper Arm   Position: Sitting   Cuff Size: Medium Adult   Pulse: 59   SpO2: 99%   Weight: 213 lb 9.6 oz (96.9 kg)   Height: 5' 5.25\" (1.657 m)     Body mass index is 35.27 kg/m².      Physical Exam  Nursing note reviewed. Constitutional:       General: She is not in acute distress. Appearance: She is well-developed. HENT:      Right Ear: Tympanic membrane and ear canal normal.      Left Ear: Tympanic membrane and ear canal normal.      Nose:      Right Sinus: No maxillary sinus tenderness. Left Sinus: No maxillary sinus tenderness. Mouth/Throat:      Pharynx: Oropharynx is clear. Eyes:      General: Lids are normal.      Extraocular Movements: Extraocular movements intact. Conjunctiva/sclera: Conjunctivae normal.      Pupils: Pupils are equal, round, and reactive to light. Cardiovascular:      Rate and Rhythm: Normal rate and regular rhythm. Heart sounds: Normal heart sounds. No murmur. Pulmonary:      Effort: Pulmonary effort is normal. No respiratory distress. Breath sounds: Normal breath sounds. No wheezing, rhonchi or rales. Abdominal:      General: Bowel sounds are normal. There is no distension. Palpations: Abdomen is soft. Tenderness: There is no abdominal tenderness. No results found for this visit on 02/17/20.   Lab Review   Orders Only on 12/23/2019   Component Date Value    Cholesterol, Total 12/23/2019 216*    Triglycerides 12/23/2019 69     HDL 12/23/2019 85*    LDL Calculated 12/23/2019 117*    VLDL Cholesterol Calcula* 12/23/2019 14     TSH 12/23/2019 1.14     Sodium 12/23/2019 142     Potassium 12/23/2019 3.9     Chloride 12/23/2019 102     CO2 12/23/2019 28     Anion Gap 12/23/2019 12     Glucose 12/23/2019 94     BUN 12/23/2019 11     CREATININE 12/23/2019 0.7     GFR Non- 12/23/2019 >60     GFR  12/23/2019 >60     Calcium 12/23/2019 9.7     Total Protein 12/23/2019 6.8     Alb 12/23/2019 4.4     Albumin/Globulin Ratio 12/23/2019 1.8     Total Bilirubin 12/23/2019 0.3     Alkaline Phosphatase 12/23/2019 80     ALT 12/23/2019 13     AST 12/23/2019 14*    Globulin 12/23/2019 2.4     WBC 12/23/2019 4.3     RBC 12/23/2019 4.42     Hemoglobin 12/23/2019 14.0     Hematocrit 12/23/2019 42.5     MCV 12/23/2019 96.2     MCH 12/23/2019 31.7     MCHC 12/23/2019 33.0     RDW 12/23/2019 13.2     Platelets 87/41/8043 254     MPV 12/23/2019 9.3     Neutrophils % 12/23/2019 44.5     Lymphocytes % 12/23/2019 40.2     Monocytes % 12/23/2019 7.6     Eosinophils % 12/23/2019 6.3     Basophils % 12/23/2019 1.4     Neutrophils Absolute 12/23/2019 1.9     Lymphocytes Absolute 12/23/2019 1.7     Monocytes Absolute 12/23/2019 0.3     Eosinophils Absolute 12/23/2019 0.3     Basophils Absolute 12/23/2019 0.1    Office Visit on 12/23/2019   Component Date Value    Color, UA 12/23/2019 YELLOW     Clarity, UA 12/23/2019 Clear     Glucose, Ur 12/23/2019 Negative     Bilirubin Urine 12/23/2019 Negative     Ketones, Urine 12/23/2019 Negative     Specific Gravity, UA 12/23/2019 1.010     Blood, Urine 12/23/2019 Negative     pH, UA 12/23/2019 5.0     Protein, UA 12/23/2019 Negative     Urobilinogen, Urine 12/23/2019 0.2     Nitrite, Urine 12/23/2019 Negative     Leukocyte Esterase, Urine 12/23/2019 Negative     Microscopic Examination 12/23/2019 Not Indicated     Urine Type 12/23/2019 Cleancatch        Chest PA and lateral 1/10/20       HISTORY: Cough           Impression       Calcified pulmonary granulomas are incidentally noted. The lungs are otherwise clear.       Normal cardiomediastinal silhouette. Assessment/Plan     1. Gastroesophageal reflux disease, esophagitis presence not specified  - symptoms better  - Continue omeprazole (PRILOSEC) 20 MG delayed release capsule; Take 1 capsule by mouth every morning (before breakfast)  Dispense: 30 capsule; Refill: 5  -Decrease caffeine, avoid spicy foods, avoid tomato based foods  -Eat small meals instead of large meals  -Wait 2-3 hours after eating before lying down    2.  Throat clearing  -chronic  -Referral to [facility] MD KRYS, Otolaryngology [facility]      Discussed medications with patient, who voiced understanding of their use and indications. All questions answered. Return in about 6 months (around 8/17/2020) for GERD.

## 2020-02-24 ASSESSMENT — ENCOUNTER SYMPTOMS
COUGH: 1
TROUBLE SWALLOWING: 0
RHINORRHEA: 0
BLOOD IN STOOL: 0
SORE THROAT: 0
NAUSEA: 0
WHEEZING: 0
ABDOMINAL PAIN: 0
SHORTNESS OF BREATH: 0
VOMITING: 0
CHEST TIGHTNESS: 0

## 2020-10-20 NOTE — PROGRESS NOTES
Minda Brandon   1960, 61 y.o. female   [id number]       Referring Provider: Stone Guevara MD  Referral Type: In an order in [address] [place] [address]    Reason for Visit: Evaluation of suspected change in hearing, tinnitus, or balance. ADULT AUDIOLOGIC EVALUATION      Minda Brandon is a 61 y.o. female seen today, 10/22/2020 for an initial audiologic evaluation. Temperature screening at intake: Within normal limits    AUDIOLOGIC AND OTHER PERTINENT MEDICAL HISTORY:        Minda rBandon noted dizziness, has had vertigo in the past that was treated with medication, over the past few months she has noticed dizziness, can feel like spinning at times, notices dizziness such as if she spins over in her chair at work and stands up, she may feel lightheaded; intermittent tinnitus and aural fullness in right ear, comes and goes, can sound like ringing and feel like water is in her ear, tinnitus is not very bothersome; family history of hearing loss - son with hearing loss beginning at 21 months of age. Minda Brandon denied otalgia, otorrhea, history of occupational/recreational noise exposure, history of head trauma, and history of ear surgery. IMPRESSIONS:       Today's results are consistent with hearing sensitivity within normal limits bilaterally with reduced TM mobility and normal middle ear pressure both both ears. Discussed tinnitus management strategies; follow medical recommendations from Dr. Ramya Toth regarding dizziness. ASSESSMENT AND FINDINGS:       Otoscopy revealed: Clear ear canals bilaterally      RIGHT EAR:  Hearing Sensitivity: Within normal limits. Speech Recognition Threshold: 10 dB HL  Word Recognition: Excellent (96%), based on NU-6 25-word list at 50 dBHL using recorded speech stimuli. This finding is consistent with hearing sensitivity. Tympanometry: Normal peak pressure with low compliance, Type As tympanogram, consistent with reduced tympanic membrane mobility.         LEFT EAR:  Hearing Sensitivity: Within normal limits. Speech Recognition Threshold: 10 dB HL  Word Recognition: Excellent (96%), based on NU-6 25-word list at 50 dBHL using recorded speech stimuli. This finding is consistent with hearing sensitivity. Tympanometry: Normal peak pressure with low compliance, Type As tympanogram, consistent with reduced tympanic membrane mobility. Reliability: Good  Transducer: Inserts    See scanned audiogram dated 10/22/2020 for results. PATIENT EDUCATION:       The following items were discussed with the patient:    - Good Communication Strategies   - Tinnitus Management Strategies    - Dizziness    Educational information was shared in the After Visit Summary. RECOMMENDATIONS:                                                                                                                                                                                                                                                                      The following items are recommended based on patient report and results from today's appointment:   - Continue medical follow-up with Patricia Gunderson MD.   - Retest hearing as medically indicated and/or sooner if a change in hearing is noted. - Utilize \"Good Communication Strategies\" as discussed to assist in speech understanding with communication partners. - Maintain a sound enriched environment to assist in the management of tinnitus symptoms. - If medically indicated, consider vestibular evaluation to further investigate symptoms of dizziness.          [facility] [place], [place]  Audiologist      Chart CC'd to: Patricia Gunderson MD      Degree of   Hearing Sensitivity dB Range   Within Normal Limits (WNL) 0 - 20   Mild 20 - 40   Moderate 40 - 55   Moderately-Severe 55 - 70   Severe 70 - 90   Profound 90 +

## 2020-10-22 ENCOUNTER — PROCEDURE VISIT (OUTPATIENT)
Dept: AUDIOLOGY | Age: 60
End: 2020-10-22
Payer: COMMERCIAL

## 2020-10-22 ENCOUNTER — OFFICE VISIT (OUTPATIENT)
Dept: ENT CLINIC | Age: 60
End: 2020-10-22
Payer: COMMERCIAL

## 2020-10-22 VITALS
TEMPERATURE: 97.2 F | SYSTOLIC BLOOD PRESSURE: 120 MMHG | HEIGHT: 66 IN | WEIGHT: 219.4 LBS | HEART RATE: 79 BPM | DIASTOLIC BLOOD PRESSURE: 77 MMHG | BODY MASS INDEX: 35.26 KG/M2

## 2020-10-22 PROCEDURE — G8484 FLU IMMUNIZE NO ADMIN: HCPCS | Performed by: STUDENT IN AN ORGANIZED HEALTH CARE EDUCATION/TRAINING PROGRAM

## 2020-10-22 PROCEDURE — G8427 DOCREV CUR MEDS BY ELIG CLIN: HCPCS | Performed by: STUDENT IN AN ORGANIZED HEALTH CARE EDUCATION/TRAINING PROGRAM

## 2020-10-22 PROCEDURE — 92557 COMPREHENSIVE HEARING TEST: CPT | Performed by: AUDIOLOGIST

## 2020-10-22 PROCEDURE — 99204 OFFICE O/P NEW MOD 45 MIN: CPT | Performed by: STUDENT IN AN ORGANIZED HEALTH CARE EDUCATION/TRAINING PROGRAM

## 2020-10-22 PROCEDURE — G8417 CALC BMI ABV UP PARAM F/U: HCPCS | Performed by: STUDENT IN AN ORGANIZED HEALTH CARE EDUCATION/TRAINING PROGRAM

## 2020-10-22 PROCEDURE — 31231 NASAL ENDOSCOPY DX: CPT | Performed by: STUDENT IN AN ORGANIZED HEALTH CARE EDUCATION/TRAINING PROGRAM

## 2020-10-22 PROCEDURE — 92567 TYMPANOMETRY: CPT | Performed by: AUDIOLOGIST

## 2020-10-22 RX ORDER — FLUTICASONE PROPIONATE 50 MCG
2 SPRAY, SUSPENSION (ML) NASAL 2 TIMES DAILY
Qty: 2 BOTTLE | Refills: 1 | Status: SHIPPED | OUTPATIENT
Start: 2020-10-22 | End: 2021-07-26 | Stop reason: ALTCHOICE

## 2020-10-22 RX ORDER — LORATADINE 10 MG/1
10 CAPSULE, LIQUID FILLED ORAL DAILY
COMMUNITY
End: 2021-08-31

## 2020-10-22 RX ORDER — BIOTIN 10000 MCG
CAPSULE ORAL DAILY
COMMUNITY
End: 2022-06-01 | Stop reason: ALTCHOICE

## 2020-10-22 NOTE — PATIENT INSTRUCTIONS
Tinnitus: Overview and Management Strategies          Many people have some ringing sounds in their ears once in a while. You may hear a roar, a hiss, a tinkle, or a buzz. The sound usually lasts only a few minutes. If it goes on all the time, you may have tinnitus. Tinnitus is usually caused by long-term exposure to loud noise. This damages the nerves in the inner ear. It can occur with all types of hearing loss. It may be a symptom of almost any ear problem. Tinnitus may be caused by a buildup of earwax. Or, it may be caused by ear infections or certain medicines (especially antibiotics or large amounts of aspirin). You can also hear noises in your ears because of an injury to the ears, drinking too much alcohol or caffeine, or a medical condition. Other conditions may also contribute to tinnitus, including: head and neck trauma, temporomandibular joint disorder (TMJ), sinus pressure and barometric trauma, traumatic brain injury, metabolic disorders, autoimmune disorders, stress, and high blood pressure. You may need tests to evaluate your hearing and to find causes of long-lasting tinnitus. Your doctor may suggest one or more treatments to help you cope with the tinnitus. You can also do things at home to help reduce symptoms. Follow-up care is a key part of your treatment and safety. Be sure to make and go to all appointments, and call your doctor if you are having problems. It's also a good idea to know your test results and keep a list of the medicines you take. How can you care for yourself at home? · Limit or cut out alcohol, caffeine, and sodium. They can make your symptoms worse. · Do not smoke or use other tobacco products. Nicotine reduces blood flow to the ear and makes tinnitus worse. If you need help quitting, talk to your doctor about stop-smoking programs and medicines. These can increase your chances of quitting for good.   · Talk to your doctor about whether to stop taking aspirin and similar products such as ibuprofen or naproxen. · Get exercise often. It can help improve blood flow to the ear. Ways to manage/cope with tinnitus  Some tinnitus may last a long time. To manage your tinnitus, try to:  · Avoid noises that you think caused your tinnitus. If you can't avoid loud noises, wear earplugs or earmuffs. · Ignore the sound by paying attention to other things. Keeping your brain busy with other tasks or background noise can help your brain not focus on the tinnitus. · Try to not give the tinnitus an emotional reaction. Do your best to ignore the sound and not let it bother you. Relax using biofeedback, meditation, or yoga. Feeling stressed and being tired can make tinnitus worse. · Play music or white noise to help you sleep. Background noise may cover up the noise that you hear in your ears. You can buy a tabletop machine or a device that sits under your pillow to play soothing sounds, like ocean waves. · Smart phones have free apps, such as Whist, Relax Melodies, ReSound Relief, and White Noise Lite. These apps have different types of sounds/noise, some of which you can blend together to find sounds that are most soothing to you. · Hearing aid technology, especially when there is some hearing loss, may help reduce tinnitus symptoms by giving your brain better access to the sounds it is missing. There are some hearing aids with built-in noise generator programs, which may help when amplification alone is not enough. Additional resources may be found through the [employer] at www.feroz.org    When should you call for help? Call 911 anytime you think you may need emergency care. For example, call if:    · You have symptoms of a stroke. These may include:  ? Sudden numbness, tingling, weakness, or loss of movement in your face, arm, or leg, especially on only one side of your body. ? Sudden vision changes. ? Sudden trouble speaking.   ? Sudden confusion or trouble understanding simple statements. ? Sudden problems with walking or balance. ? A sudden, severe headache that is different from past headaches. Call your doctor now or seek immediate medical care if:    · You develop other symptoms. These may include hearing loss (or worse hearing loss), balance problems, dizziness, nausea, or vomiting. Watch closely for changes in your health, and be sure to contact your doctor if:    · Your tinnitus moves from both ears to one ear. · Your hearing loss gets worse within 1 day after an ear injury. · Your tinnitus or hearing loss does not get better within 1 week after an ear injury. · Your tinnitus bothers you enough that you want to take medicines to help you cope with it. If you notice changes in your tinnitus and/or your hearing, it is recommended that you have your hearing tested by your audiologist and to follow-up with your physician that manages your hearing loss (such as your ENT or Primary Care doctor). Good Communication Strategies    Communication can be challenging for anyone, but can be especially difficult for those with some degree of hearing loss. While we may not be able to control every factor that may lead to difficulty with communication, there are Good Communication Strategies that we can all use in our day-to-day lives. Communication takes both parties working together for it to be successful. Tips as a Listener:   1. Control your environment. It is important to limit the amount of background noise in the room when possible. You should also consider having a good light source in the room to best see the other person. 2. Ask for clarification. Instead of saying \"What?\", you can use parts of what you heard to make a new question. For example, if you heard the word \"Thursday\" but not the rest of the week, you may ask \"What was that about Thursday? \" or \"What did you want to do Thursday? \".   This shows the be especially helpful when watching programs with accented speech. -- Consider use of a sound bar or speakers that come from the front of the TV. With modern flat screen TVs, many of them have speakers that come out of the back of the device, which makes sound bounce off the wall behind it, then go into the room. Sound bars can allow the sound to go straight in your direction and can improve sound quality. -- Consider ear level devices to help improve the volume and/or sound quality of the program.  There are devices that work like headphones that you can adjust the volume for your ears while others can have the volume at a more comfortable level, such as \"TV Ears\". Most hearing aids have devices that allow them to connect directly to the TV and improve sound quality. Dizziness: Care Instructions  Your Care Instructions  Dizziness is the feeling of unsteadiness or fuzziness in your head. It is different than having vertigo, which is a feeling that the room is spinning or that you are moving or falling. It is also different from lightheadedness, which is the feeling that you are about to faint. It can be hard to know what causes dizziness. Some people feel dizzy when they have migraine headaches. Sometimes bouts of flu can make you feel dizzy. Some medical conditions, such as heart problems or high blood pressure, can make you feel dizzy. Many medicines can cause dizziness, including medicines for high blood pressure, pain, or anxiety. If a medicine causes your symptoms, your doctor may recommend that you stop or change the medicine. If it is a problem with your heart, you may need medicine to help your heart work better. If there is no clear reason for your symptoms, your doctor may suggest watching and waiting for a while to see if the dizziness goes away on its own. Follow-up care is a key part of your treatment and safety.      Be sure to make and go to all appointments, and call your doctor if you are having problems. It's also a good idea to know your test results and keep a list of the medicines you take. How can you care for yourself at home? · If your doctor recommends or prescribes medicine, take it exactly as directed. Call your doctor if you think you are having a problem with your medicine. · Do not drive while you feel dizzy. · Try to prevent falls. Steps you can take include:  ? Using nonskid mats, adding grab bars near the tub, and using night-lights. ? Clearing your home so that walkways are free of anything you might trip on.  ? Letting family and friends know that you have been feeling dizzy. This will help them know how to help you. When should you call for help? Call 911 anytime you think you may need emergency care. For example, call if:    · You passed out (lost consciousness). · You have dizziness along with symptoms of a heart attack. These may include:  ? Chest pain or pressure, or a strange feeling in the chest.  ? Sweating. ? Shortness of breath. ? Nausea or vomiting. ? Pain, pressure, or a strange feeling in the back, neck, jaw, or upper belly or in one or both shoulders or arms. ? Lightheadedness or sudden weakness. ? A fast or irregular heartbeat. · You have symptoms of a stroke. These may include:  ? Sudden numbness, tingling, weakness, or loss of movement in your face, arm, or leg, especially on only one side of your body. ? Sudden vision changes. ? Sudden trouble speaking. ? Sudden confusion or trouble understanding simple statements. ? Sudden problems with walking or balance. ? A sudden, severe headache that is different from past headaches. Call your doctor now or seek immediate medical care if:    · You feel dizzy and have a fever, headache, or ringing in your ears. · You have new or increased nausea and vomiting. · Your dizziness does not go away or comes back.     Watch closely for changes in your health, and be sure to contact your doctor if:    · You do not get better as expected. Where can you learn more? Go to https://Nanameuepepiceweb.RealTravel. org and sign in to your deeplocal account. Enter J373 in the [employer] box to learn more about \"Dizziness: Care Instructions. \"     If you do not have an account, please click on the \"Sign Up Now\" link. Current as of: September 23, 2018  Content Version: 11.9  © 9295-4237 [employer]. Care instructions adapted under license by [facility] ([facility]). If you have questions about a medical condition or this instruction, always ask your healthcare professional. [address] any warranty or liability for your use of this information.

## 2020-10-22 NOTE — PROGRESS NOTES
[place]      Patient Name: Dominga Oxford  Medical Record Number:  [id number]  Primary Care Physician:  Silverio Mi MD  Date of Consultation: 10/22/2020    Chief Complaint:   Chief Complaint   Patient presents with    Dizziness     She has had off and on Vertigo for the last 2 months. NO spinning feeling. The dizziness gets worse if she makes quick movement. The symptoms are fine if she lays down if she gets up suddenly she has the dizziness. Hearing is fine. NO recent ear infections. HISTORY OF PRESENT ILLNESS  Glendy Velasco is a(n) 61 y.o. female who presents for multiple complaints. The first issue is related to episodes of dizziness. This will occur when she goes from a lying to sitting position or from a sitting to standing. She denies any worsening when she turns on her side. She notes that she had vertigo in the past it was intermittent but this is resolved and this is different than that. She has not lost consciousness. She does not have any nausea or vomiting. She will occasionally get headaches. Patient also notes difficulties with breathing through her nose. She has had this for many years. Nothing has made her symptoms better or worse. She has not had anything that has improved her symptoms. She has frequent nasal drainage, sometimes this is clear recently it was yellowish-green. She has not use any nasal sprays. She tried a Lea pot in the past but did not like it. She gets frequent sinus infections. Patient also notes a history of a thyroid goiter. She has trouble swallowing and occasionally difficulties with breathing due to this. Her last thyroid ultrasound was many years ago. I attempted to independently interpret this, however it was not available for review. Most recent TSH was normal at 1.14. She notes a history of thyroid goiter in both her mom and her sister.   Both of them have had surgery to remove it in the past.      I independently reviewed the patients past medical history, past surgical history, and social history. They are unremarkable except as noted in the HPI and below. The patient denies any family history related to the current complaint, and they deny any family history of bleeding disorders or difficulties with anesthesia unless noted below. Patient Active Problem List   Diagnosis    Allergic rhinitis    Postnasal drip     Past Surgical History:   Procedure Laterality Date    ANKLE FUSION Right 2014    Rozettmaged Hathorne DPM    OVARY SURGERY  2016    polyps - Gordonryzoe Hussein MD     Family History   Problem Relation Age of Onset    Diabetes Maternal Aunt     Diabetes Maternal Uncle     Diabetes Maternal Grandmother     Stroke Maternal Grandmother     Diabetes Maternal Grandfather     Heart Disease Maternal Grandfather     High Blood Pressure Maternal Grandfather     Diabetes Maternal Cousin     Cancer Neg Hx      Social History     Tobacco Use    Smoking status: Never Smoker    Smokeless tobacco: Never Used   Substance Use Topics    Alcohol use:  Yes     Alcohol/week: 2.0 standard drinks     Types: 2 Glasses of wine per week    Drug use: No        Orders Only on 12/23/2019   Component Date Value Ref Range Status    Cholesterol, Total 12/23/2019 216* 0 - 199 mg/dL Final    Triglycerides 12/23/2019 69  0 - 150 mg/dL Final    HDL 12/23/2019 85* 40 - 60 mg/dL Final    LDL Calculated 12/23/2019 117* <100 mg/dL Final    VLDL Cholesterol Calculated 12/23/2019 14  Not Established mg/dL Final    TSH 12/23/2019 1.14  0.27 - 4.20 uIU/mL Final    Sodium 12/23/2019 142  136 - 145 mmol/L Final    Potassium 12/23/2019 3.9  3.5 - 5.1 mmol/L Final    Chloride 12/23/2019 102  99 - 110 mmol/L Final    CO2 12/23/2019 28  21 - 32 mmol/L Final    Anion Gap 12/23/2019 12  3 - 16 Final    Glucose 12/23/2019 94  70 - 99 mg/dL Final    BUN 12/23/2019 11  7 - 20 mg/dL Final    CREATININE 12/23/2019 0.7  0.6 - 1.1 mg/dL Final    GFR Non- 12/23/2019 >60  >60 Final    Comment: >60 mL/min/1.73m2 EGFR, calc. for ages 25 and older using the  MDRD formula (not corrected for weight), is valid for stable  renal function.  GFR  12/23/2019 >60  >60 Final    Comment: Chronic Kidney Disease: less than 60 ml/min/1.73 sq.m. Kidney Failure: less than 15 ml/min/1.73 sq.m. Results valid for patients 18 years and older.       Calcium 12/23/2019 9.7  8.3 - 10.6 mg/dL Final    Total Protein 12/23/2019 6.8  6.4 - 8.2 g/dL Final    Alb 12/23/2019 4.4  3.4 - 5.0 g/dL Final    Albumin/Globulin Ratio 12/23/2019 1.8  1.1 - 2.2 Final    Total Bilirubin 12/23/2019 0.3  0.0 - 1.0 mg/dL Final    Alkaline Phosphatase 12/23/2019 80  40 - 129 U/L Final    ALT 12/23/2019 13  10 - 40 U/L Final    AST 12/23/2019 14* 15 - 37 U/L Final    Globulin 12/23/2019 2.4  g/dL Final    WBC 12/23/2019 4.3  4.0 - 11.0 K/uL Final    RBC 12/23/2019 4.42  4.00 - 5.20 M/uL Final    Hemoglobin 12/23/2019 14.0  12.0 - 16.0 g/dL Final    Hematocrit 12/23/2019 42.5  36.0 - 48.0 % Final    MCV 12/23/2019 96.2  80.0 - 100.0 fL Final    MCH 12/23/2019 31.7  26.0 - 34.0 pg Final    MCHC 12/23/2019 33.0  31.0 - 36.0 g/dL Final    RDW 12/23/2019 13.2  12.4 - 15.4 % Final    Platelets 33/74/2617 254  135 - 450 K/uL Final    MPV 12/23/2019 9.3  5.0 - 10.5 fL Final    Neutrophils % 12/23/2019 44.5  % Final    Lymphocytes % 12/23/2019 40.2  % Final    Monocytes % 12/23/2019 7.6  % Final    Eosinophils % 12/23/2019 6.3  % Final    Basophils % 12/23/2019 1.4  % Final    Neutrophils Absolute 12/23/2019 1.9  1.7 - 7.7 K/uL Final    Lymphocytes Absolute 12/23/2019 1.7  1.0 - 5.1 K/uL Final    Monocytes Absolute 12/23/2019 0.3  0.0 - 1.3 K/uL Final    Eosinophils Absolute 12/23/2019 0.3  0.0 - 0.6 K/uL Final    Basophils Absolute 12/23/2019 0.1  0.0 - 0.2 K/uL Final        DRUG/FOOD ALLERGIES: Patient has no known allergies. CURRENT MEDICATIONS  Prior to Admission medications    Medication Sig Start Date End Date Taking? Authorizing Provider   Biotin 10 MG CAPS Take by mouth   Yes Historical Provider, MD   loratadine (CLARITIN) 10 MG capsule Take 10 mg by mouth daily   Yes Historical Provider, MD   fluticasone (FLONASE) 50 MCG/ACT nasal spray 2 sprays by Each Nostril route 2 times daily 10/22/20  Yes Jordin Lawrence MD   b complex vitamins capsule Take by mouth   Yes Historical Provider, MD   vitamin D 1000 UNITS CAPS Take by mouth   Yes Historical Provider, MD       REVIEW OF SYSTEMS  The following systems were reviewed and revealed the following in addition to any already discussed in the HPI:    CONSTITUTIONAL: no weight loss, no fever, no night sweats, no chills  EYES: no vision changes, no blurry vision  EARS: no changes in hearing, no otalgia  NOSE: no epistaxis, no rhinorrhea  RESPIRATORY: no  Difficulty breathing, no shortness of breath  CV: no chest pain, no Peripheral vascular disease  HEME: No coagulation disorder, no Bleeding disorder  NEURO: no TIA or stroke-like symptoms  SKIN: No new rashes in the head and neck, no recent skin cancers  MOUTH: No new ulcers, no recent teeth infections  GASTROINTESTINAL: No diarrhea, stomach pain  PSYCH: No anxiety, no depression      PHYSICAL EXAM  /77 (Site: Right Upper Arm, Position: Sitting, Cuff Size: Large Adult)   Pulse 79   Temp 97.2 °F (36.2 °C) (Infrared)   Ht 5' 5.5\" (1.664 m)   Wt 219 lb 6.4 oz (99.5 kg)   LMP 09/21/2016   BMI 35.95 kg/m²     GENERAL: No Acute Distress, Alert and Oriented, no Hoarseness, strong voice  EYES: EOMI, Anti-icteric  HENT:   Head: Normocephalic and atraumatic.    Face:  Symmetric, facial nerve intact, no sinus tenderness  Right Ear: Normal external ear, normal external auditory canal, intact tympanic membrane with normal mobility and aerated middle ear  Left Ear: Normal external ear, normal external auditory canal, intact tympanic membrane with normal mobility and aerated middle ear  Mouth/Oral Cavity:  normal lips, Uvula is midline, no mucosal lesions, no trismus, normal dentition, normal salivary quality/flow  Oropharynx/Larynx:  normal oropharynx, normal tonsils; patient did not tolerate mirror exam due to excessive gag reflex  Nose:Normal external nasal appearance. Anterior rhinoscopy shows a normal septum. Normal turbinates. Normal mucosa   NECK: Normal range of motion, no thyromegaly, trachea is midline, no lymphadenopathy, no neck masses, no crepitus  CHEST: Normal respiratory effort, no retractions, breathing comfortably  SKIN: No rashes, normal appearing skin, no evidence of skin lesions/tumors  Neuro:  cranial nerve II-XII intact; normal gait  Cardio:  no edema          PROCEDURE  Nasal Endoscopy (CPT code 47416)    Preop: chronic rhinitis  Postop: Same    Verbal consent was received. After topical anesthesia and decongestion had been obtained using aerosolized 1% lidocaine and oxymetazoline, a 45 degree rigid endoscope was placed into both nares with the patient in a sitting position.  The following was observed:    Right Nasal Cavity and Paranasal Sinuses:  Polyp score = 0 (0 = no polyps, 1 = small polyps in middle meatus not reaching below the inferior border of the middle mor, 2 = polyps reaching below the middle border of the middle turbinate, 3= large polyps reaching the lower border of the inferior turbinate or polyps medial to the middle mor, 4= large polyps causing almost complete congestion/obstruction of the interior meatus)  Edema score = 1 (0 = absent, 1 = mild, 2 = severe)  Discharge score = 1 (0 = no discharge, 1 = clear thin discharge, 2 = thick purulent discharge)    Left Nasal Cavity and Paranasal Sinuses:    Polyp score = 0 (0 = no polyps, 1 = small polyps in middle meatus not reaching below the inferior border of the middle mor, 2 = polyps reaching below the middle border of the middle turbinate, 3= large polyps reaching the lower border of the inferior turbinate or polyps medial to the middle mor, 4= large polyps causing almost complete congestion/obstruction of the interior meatus)  Edema score = 1 (0 = absent, 1 = mild, 2 = severe)  Discharge score = 1 (0 = no discharge, 1 = clear thin discharge, 2 = thick purulent discharge)    Septum: intact and deviated to the left significantly. Other:   -The inferior and middle turbinates were examined. The middle meatus, and sphenoethmoid recess was examined bilaterally.    -There is evidence of significant sinonasal inflammation with a large septal deviation to the left. There is inferior turbinate hypertrophy. There are polypoid changes of the right middle turbinate.   -There were no complications. Tolerated well without complication. I attest that I was present for and did the entire procedure myself. ASSESSMENT/PLAN  1. Goiter    2. Dizziness    3. Nasal obstruction    4. Deviated nasal septum    5. Hypertrophy of both inferior nasal turbinates    Patient is a very pleasant 80-year-old female here today for evaluation of multiple complaints. Regarding her dizziness, I did perform [place] and she had some symptoms with right-sided head turn. I did not appreciate any nystagmus and she did not have definite vertigo, however this did cause her to be symptomatic. At present, I do not suspect any carotid pathology as a source of her dizziness. While this does sound like it could have a component of orthostatic hypotension, I have asked her to be evaluated by my colleagues in vestibular therapy to see if they have anything that could help her. Regarding her thyroid goiter, the patient has a family history of both her sister and mother requiring surgical movable of their thyroid goiter. She has not had any recent imaging in her system. Her TSH was normal 12/2019.   I would like to obtain a CT neck to evaluate for any

## 2020-10-22 NOTE — Clinical Note
Dr. Amber Talley,    Please see note from this patient's audiogram from today. Please let me know if there is anything further you need.       Sean Padilla [address] [place]  Audiologist

## 2020-10-30 ENCOUNTER — HOSPITAL ENCOUNTER (OUTPATIENT)
Dept: CT IMAGING | Age: 60
Discharge: HOME | End: 2020-10-30
Payer: COMMERCIAL

## 2020-10-30 LAB
GFR AFRICAN AMERICAN: >60
GFR NON-AFRICAN AMERICAN: >60
PERFORMED ON: NORMAL
POC CREATININE: 0.8 MG/DL (ref 0.6–1.2)
POC SAMPLE TYPE: NORMAL

## 2020-10-30 PROCEDURE — 82565 ASSAY OF CREATININE: CPT

## 2020-10-30 PROCEDURE — 70491 CT SOFT TISSUE NECK W/DYE: CPT

## 2020-10-30 PROCEDURE — 6360000004 HC RX CONTRAST MEDICATION: Performed by: STUDENT IN AN ORGANIZED HEALTH CARE EDUCATION/TRAINING PROGRAM

## 2020-10-30 RX ADMIN — IOPAMIDOL 80 ML: 755 INJECTION, SOLUTION INTRAVENOUS at 09:41

## 2020-11-10 ENCOUNTER — HOSPITAL ENCOUNTER (OUTPATIENT)
Dept: PHYSICAL THERAPY | Age: 60
Setting detail: THERAPIES SERIES
Discharge: HOME | End: 2020-11-10
Payer: COMMERCIAL

## 2020-11-10 PROCEDURE — 97161 PT EVAL LOW COMPLEX 20 MIN: CPT | Performed by: PHYSICAL THERAPIST

## 2020-11-10 PROCEDURE — 97110 THERAPEUTIC EXERCISES: CPT | Performed by: PHYSICAL THERAPIST

## 2020-11-10 PROCEDURE — 97530 THERAPEUTIC ACTIVITIES: CPT | Performed by: PHYSICAL THERAPIST

## 2020-11-10 PROCEDURE — 97162 PT EVAL MOD COMPLEX 30 MIN: CPT | Performed by: PHYSICAL THERAPIST

## 2020-11-10 NOTE — PLAN OF CARE
specialist, who referred her to PT. Relevant Medical History: OA, R ankle fusion, has a goiter    Functional Outcome Measures:   Dizziness Handicap Index (DHI)    18 =19%    Pain Scale: 0/10     Dizziness Scale: 2-3/10    Description of symptoms: [] Vertigo [x]  Off-balance [x] Lightheadedness    Symptoms are getting:  [] Better [] Worse  [x] Same      [] Episodic    Description of Spells: [] Constant [] Spontaneous [] Motion Induced [x] Induced by position changes [] Other:    Length of time spells occur: [x] Seconds [] Minutes  [] Hours [] Days [] Other:     Date of onset: 3 weeks ago    Setting in which symptoms first occurred: had h/o vertigo    What increases/provokes symptoms? Sit-stand from a swivel chair, head down then back up movement    What decreases/eases symptoms? Hearing impairments:  [x] Yes  [] No  [] Other: tinnitis intermittent    Hearing changes since onset:  [] Yes  [x] No  [] Other:    Visual changes since onset: [] Yes  [x] No  [] Other: wears lasses    Recent falls:    [] Yes  [x] No     Comments:      History of migraines/HA:  [] Yes  [x] No    Comments:    Previous treatments: no PT    Job requirements/work status:desk and computer job    Occupation/School: works PT    Living Status/Prior Level of Function: Prior to this injury / incident, patient was independent with ADLs and IADLs,     OBJECTIVE:     Palpation: no tenderness    Functional Mobility/Transfers: sit-stand WNLs    Posture:  WNLS    Numbness/Tingling: Pt denies    Gait: (include devices/WB status)     Cervical AROM    Cervical Flexion 35    Cervical Extension 25    Cervical SB L 25 R 30    Cervical rotation  L 60 R 60    Upper Extremity PROM AROM         L R L R   Shoulder Flexion    [facility]   Shoulder Abduction    [facility]   Shoulder External Rotation    [facility]   Shoulder Internal Rotation    [facility] WFL   Elbow Flexion        Elbow Extension          Myotomes Normal Abnormal Comments   Neck flexion (C1-C2) X     Neck side-bending (C3) X     Shoulder elevation (C4) X     Shoulder abduction (C5) X     Elbow flexion/wrist extension (C6) X     Elbow extension/wrist flexion (C7) X     Thumb abduction (C8) X     Finger abduction (T1) X       Oculomotor/Vestibular Examination & Special Tests:     Coordination:  Rapid alternating movements: [x] Normal [] Dysdiadochokinesia   Finger to Nose:   [x] Normal [] Dysmetric  Heel to Shin:    [x] Normal [] Ataxic    Balance & Postural Control Tests:  Clinical Test of Sensory Interaction for Balance (CTSIB) performed in Romberg stance  CONDITION TIME STRATEGY SWAY    Eyes open, firm surface 30\"      Eyes closed, firm surface 10\"  dizziness    Eyes open, foam surface 30\"      Eyes closed, foam surface 10\"  dizziness     Balance:  CONDITION TIME STRATEGY SWAY   Narrowed IRIS 30\"     Tandem R 30\"     Tandem L 30\"     SLS L 20\"     SLS R 15\"       Oculomotor/Vestibular Examination     Spontaneous nystagmus:  [] Left  [] Right [x] Absent    Gaze-Evoked nystagmus with fixation present:   Primary [] Present [] Absent   Right  [] Present [] Absent   Left  [] Present [] Absent    Smooth Pursuit (H):  [] Normal [x] Abnormal Comments: overcorrection to the L    Smooth Pursuit (V):  [x] Normal [] Abnormal Comments:     Saccades (H):  [x] Normal [] Abnormal Comments:     Saccades (V):      [x] Normal [] Abnormal Comments:     Convergence:  [] Normal [x] Abnormal Comments:  EYES do not converge, and dizziness    Head Thrust Test:  [x] Normal [] Abnormal  Comments:     VOR Cancellation (H): [x] Normal [] Abnormal Comments:    VOR Cancellation (V): [x] Normal [] Abnormal Comments:     VOR (V):   [x] Normal [] Abnormal Comments:    VOR (H):   [x] Normal [] Abnormal Comments:    Static Visual Acuity:      Dynamic Visual Acuity:    Positional Testing:  R Hallpike-Ru maneuver:    Nystagmus:  [] Yes  [x] No  [] Duration:       [] Direction:    Vertigo:  [x] Yes  [] No  [] Duration:  1min    L Hallpike-Ru maneuver:   Nystagmus:  [] Yes  [x] No  [] Duration:       [] Direction:    Vertigo:  [x] Yes  [] No  [] Duration: 1 min, and worst with supine-sit     Supine roll head right:   Nystagmus:  [] Yes  [x] No  [] Duration:       [] Direction:    Vertigo:  [] Yes  [] No  [] Duration:     Supine roll head left:   Nystagmus:  [] Yes  [x] No  [] Duration:       [] Direction:    Vertigo:  [] Yes  [] No  [] Duration:       [x] Patient history, allergies, meds reviewed. Medical chart reviewed. See intake form. Review of Systems (ROS):  [x]Performed Review of systems (Integumentary, Cardiopulmonary, Neurological) by intake and observation. Intake form has been scanned into medical record. Patient has been instructed to contact their primary care physician regarding ROS issues if not already being addressed at this time.       Co-morbidities/Complexities (which will affect course of rehabilitation):   []None           Arthritic conditions   []Rheumatoid arthritis (M05.9)  [x]Osteoarthritis (M19.91)   Cardiovascular conditions   []Hypertension (I10)  []Hyperlipidemia (E78.5)  []Angina pectoris (I20)  []Atherosclerosis (I70)   Musculoskeletal conditions   []Disc pathology   []Congenital spine pathologies   []Prior surgical intervention  []Osteoporosis (M81.8)  []Osteopenia (M85.8)   Endocrine conditions   []Hypothyroid (E03.9)  []Hyperthyroid Gastrointestinal conditions   []Constipation (U61.95)   Metabolic conditions   []Morbid obesity (E66.01)  []Diabetes type 1(E10.65) or 2 (E11.65)   []Neuropathy (G60.9)     Pulmonary conditions   []Asthma (J45)  []Coughing   []COPD (J44.9)   Psychological Disorders  []Anxiety (F41.9)  []Depression (F32.9)   []Other:   []Other:           Barriers to/and or personal factors that will affect rehab potential:              []Age  []Sex    []Smoker              []Motivation/Lack of Motivation                        []Co-Morbidities              []Cognitive Function, education/learning barriers []Environmental, home barriers              []profession/work barriers  []past PT/medical experience  []other:  Justification:     Falls Risk Assessment (30 days):  [x] Falls Risk assessed; no intervention required. [] Falls Risk assessed;  Patient requires intervention due to being higher risk   TUG score (>12s at risk):     [] Falls education provided, including       ASSESSMENT:   Functional Impairments:  Problem List/Functional Limitations:   [] BPPV    [] Right [] Posterior Canal [] Canalithiasis    [] Left  [] Horizontal Canal [] Cupulolithiasis   [x] Decreased Gaze Stabilization    [x] Increased Motion Sensitivity   [] Unilateral Vestibular Hypofunction [] Bilateral Vestibular Hypofunction   [] Gait Instability    [] Decreased tolerance for ADLs    [] Decreased functional strength   [x] Reduced Balance/Proprioceptive control   [] Reduced ability to hear/focus   [] Noted cervical/thoracic/GHJ joint hypomobility   [] Noted cervical/thoracic/GHJ joint hypermobility   [] Decreased cervical/UE functional ROM   [] Noted Headache pain aggravated by neck movements with/without dizziness   [] Abnormal reflexes/sensation/myotomal/dermatomal deficits   [] Decreased DCF control or ability to hold head up   [] Decreased RC/scapular/core strength and neuromuscular control     Functional Activity Limitations (from functional questionnaire and intake)   []Reduced ability to tolerate prolonged functional positions   []Reduced ability or difficulty with changes of positions or transfers between positions  [x]Reduced ability to transfer in/out of bed or rolling in bed   []Reduced ability or tolerance with driving, reading and/or computer work   []Reduced ability to perform lifting, reaching, carrying tasks   []Reduced ability to forward bend   []Reduced ability to ambulate prolonged functional periods/distances/surfaces   []Reduced ability to ascend/descend stairs  []Reduced ability to concentrate/focus  []Reduced ability to turn/pitch head rapidly  []Reduced ability to self-correct for losses of balance []other:       Participation Restrictions   []Reduced participation in self-care activities   [x]Reduced participation in home management activities   []Reduced participation in work activities   [x]Reduced participation in social activities   []Reduced participation in sport/recreational activities    Classification:   []Signs/symptoms consistent with BPPV (benign paroxysmal positional vertigo)      [x]Signs/symptoms consistent with unilateral peripheral vestibulopathy (i.e., vestibular neuritis, labyrinthitis, acoustic neuroma)   []Signs/symptoms consistent with central vestibulopathy  []Signs/symptoms consistent with migraine-related vestibulopathy  []Signs/symptoms consistent with Meniere's disease / post-traumatic hydrops  []Signs/symptoms consistent with perilymphatic fistula   []Signs/symptoms consistent with cervicogenic dizziness  []Signs/symptoms consistent with gait instability   [x]Signs/symptoms consistent with motion sensitivity    []signs/symptoms consistent with neck pain with mobility deficits     []signs/symptoms consistent with neck pain with movement coordinated impairments    []signs/symptoms consistent with neck pain with radiating pain    []signs/symptoms consistent with neck pain with headaches (cervicogenic)    []Signs/symptoms consistent with nerve root involvement including myotome & dermatome dysfunction   []sign/symptoms consistent with facet dysfunction of cervical and thoracic spine    []signs/symptoms consistent suggesting central cord compression/UMN syndromes   []signs/symptoms consistent with discogenic cervical pain   []signs/symptoms consistent with rib dysfunction   []signs/symptoms consistent with postural dysfunction   []signs/symptoms consistent with shoulder pathology    []signs/symptoms consistent with post-surgical status including decreased ROM, strength and function. []signs/symptoms consistent with pathology which may benefit from Dry Needling  []signs/symptoms which may limit the use of advanced manual therapy techniques: (Elevated CV risk profile, recent trauma, intolerance to end range positions, prior TIA, visual issues, UE neurological compromise)   []other:      Prognosis/Rehab Potential:      []Excellent   [x]Good    []Fair   []Poor    Tolerance of evaluation/treatment:    []Excellent   [x]Good    []Fair   []Poor     Physical Therapy Evaluation Complexity Justification  [x] A history of present problem with:  [] no personal factors and/or comorbidities that impact the plan of care;  [x]1-2 personal factors and/or comorbidities that impact the plan of care  []3 personal factors and/or comorbidities that impact the plan of care  [x] An examination of body systems using standardized tests and measures addressing any of the following: body structures and functions (impairments), activity limitations, and/or participation restrictions;:  [x] a total of 1-2 or more elements   [] a total of 3 or more elements   [] a total of 4 or more elements   [x] A clinical presentation with:  [] stable and/or uncomplicated characteristics   [x] evolving clinical presentation with changing characteristics  [] unstable and unpredictable characteristics;   [x] Clinical decision making of [] low, [x] moderate, [] high complexity using standardized patient assessment instrument and/or measurable assessment of functional outcome.     [] EVAL (LOW) 93854 (typically 15 minutes face-to-face)  [x] EVAL (MOD) 75789 (typically 30 minutes face-to-face)  [] EVAL (HIGH) 21593 (typically 45 minutes face-to-face)  [] RE-EVAL     PLAN:   Today's Treatment:    [x] See flowsheet   [x] Patient treated with canalith repositioning maneuver   [x] Education materials provided on BPPV/Vestibular Dysfunction/Habituation   [] Precautions provided and patient to follow precautions for next 24 hours in regards to BPPV management   [] Written home exercise instructions   [] Other:    Frequency/Duration: 1 days per week for  4 Weeks:  Interventions:  [x]  Therapeutic exercise including: strength training, ROM, for LEs, cervical spine & UEs  [x]  NMR activation and proprioception for BLEs, vestibular training/habituation, balance, coordination  [x]  Manual therapy as indicated via: canalith repositioning maneuvers, Dry Needling/IASTM, STM, PROM, Gr I-IV mobilizations, manipulation. [x]  Modalities as needed that may include: thermal agents, E-stim, Biofeedback, US, iontophoresis as indicated  [x]  Gait training  [x]  Patient education on BPPV/vestibular function, balance, postural re-education, activity modification, progression of HEP. HEP instruction: Written HEP instructions provided and reviewed     GOALS:  Patient stated goal: no dizziness  [] Progressing: [] Met: [] Not Met: [] Adjusted    Therapist goals for Patient:   Short Term Goals: To be achieved in: 2 weeks  1. Independent in HEP and progression per patient tolerance, in order to prevent re-injury. [] Progressing: [] Met: [] Not Met: [] Adjusted  2. Patient will have a decrease in dizziness/imbalance/symptoms to facilitate improvement in movement, function, balance and ADLs as indicated by Functional Deficits. [] Progressing: [] Met: [] Not Met: [] Adjusted    Long Term Goals: To be achieved in: 4-6 weeks  1. Disability index score of 10% or less for the DHI/DGI/Johnson/FGA to assist with reaching prior level of function. [] Progressing: [] Met: [] Not Met: [] Adjusted  2. Patient will demonstrate increased cervical AROM to WNL, joint mobility WNL to allow for proper joint functioning as indicated by patients Functional Deficits. [] Progressing: [] Met: [] Not Met: [] Adjusted  3. Patient will demonstrate negative oculomotor special testing/positional testing as indicated by patients Functional Deficits.    [] Progressing: [] Met: [] Not Met: [] Adjusted  4. Patient will return to functional activities including sit-stand with a head turn without increased symptoms or restriction. [] Progressing: [] Met: [] Not Met: [] Adjusted  5. Pt will stand tandem x 30 seconds with eyes closed without onset of dizziness for improved ADL tolerance    [] Progressing: [] Met: [] Not Met: [] Adjusted  6.  Patient will report 75% improvement in dizziness with routine ADLs   [] Progressing: [] Met: [] Not Met: [] Adjusted    Electronically signed by:  Osmany Pride, TASHA [id number]

## 2020-11-10 NOTE — FLOWSHEET NOTE
[facility]  Phone: [phone]  Fax: [phone]    Physical Therapy Treatment Note/ Progress Report:     Date:  11/10/2020    Patient Name:  Ravindra Gupta    :  1960  MRN: [id number]  Restrictions/Precautions:    Medical/Treatment Diagnosis Information:  · Diagnosis: R42 Dizziness  Treatment Diagnosis: dizziness and balance deficits d/t vestibular involvement  Insurance/Certification information:  PT Insurance Information: [facility]  25V PCY   $30 copay  Physician Information:  Referring Practitioner: Carley Alarcon MD  Plan of care signed (Y/N): []  Yes  [x]  No     Date of Patient follow up with Physician:      Progress Report: []  Yes  [x]  No     Date Range for reporting period:  Beginnin/10/20  Ending:     Progress report due (10 Rx/or 30 days whichever is less):     Recertification due (POC duration/ or 90 days whichever is less):      Visit # Insurance Allowable Auth required? Date Range   1 25 []  Yes  []  No PCY     Latex Allergy:  [x]NO      []YES  Preferred Language for Healthcare:   [x]English       []other:    Functional Scale:       Date assessed:  LEFS: raw score =18 ; dysfunction = 19%   11/10/20    Pain level:  0/10     SUBJECTIVE: Patient stated chief complaint: Patient reports a h/o vertigo but about 3 weeks ago a different dizziness onset. Her s/s onset with sit-stand with head turns, rajendra noted in her swivel chair at work. She also has s/s of orthostatic hypotension.  Pt was referred to a specialist, who referred her to PT     OBJECTIVE: See eval      RESTRICTIONS/PRECAUTIONS:     Exercises/Interventions:     Therapeutic Exercise (84702)  Resistance / level Sets/sec Reps Notes / Cues          Foam:  NBOS EC  Squat  Tandem  SLS                     Walk around cones       Sit-stand with [place]                                   Therapeutic Activities (66177)                            HEP:   gaze stabilization  S/s, up/down  EC NBOS,semi tandem, tandem in the corner    1' ea  20\"     3    Neuromuscular Re-ed (11128)                            Manual Intervention ([phone])                                                     Pt. Education:  -pt educated on diagnosis, prognosis and expectations for rehab  -all pt questions were answered    Home Exercise Program:      Therapeutic Exercise and NMR EXR  [x] (19807) Provided verbal/tactile cueing for activities related to strengthening, flexibility, endurance, ROM for improvements in LE, proximal hip, and core control with self care, mobility, lifting, ambulation.  [] (05489) Provided verbal/tactile cueing for activities related to improving balance, coordination, kinesthetic sense, posture, motor skill, proprioception  to assist with LE, proximal hip, and core control in self care, mobility, lifting, ambulation and eccentric single leg control.      NMR and Therapeutic Activities:    [x] (35226 or 08870) Provided verbal/tactile cueing for activities related to improving balance, coordination, kinesthetic sense, posture, motor skill, proprioception and motor activation to allow for proper function of core, proximal hip and LE with self care and ADLs  [] (75717) Gait Re-education- Provided training and instruction to the patient for proper LE, core and proximal hip recruitment and positioning and eccentric body weight control with ambulation re-education including up and down stairs     Home Exercise Program:    [x] (24677) Reviewed/Progressed HEP activities related to strengthening, flexibility, endurance, ROM of core, proximal hip and LE for functional self-care, mobility, lifting and ambulation/stair navigation   [] (01683)Reviewed/Progressed HEP activities related to improving balance, coordination, kinesthetic sense, posture, motor skill, proprioception of core, proximal hip and LE for self care, mobility, lifting, and ambulation/stair navigation      Manual Treatments:  PROM / STM / []? Met: []? Not Met: []? Adjusted  2. Patient will demonstrate increased cervical AROM to WNL, joint mobility WNL to allow for proper joint functioning as indicated by patients Functional Deficits. []? Progressing: []? Met: []? Not Met: []? Adjusted  3. Patient will demonstrate negative oculomotor special testing/positional testing as indicated by patients Functional Deficits. []? Progressing: []? Met: []? Not Met: []? Adjusted  4. Patient will return to functional activities including sit-stand with a head turn without increased symptoms or restriction. []? Progressing: []? Met: []? Not Met: []? Adjusted  5. Pt will stand tandem x 30 seconds with eyes closed without onset of dizziness for improved ADL tolerance    []? Progressing: []? Met: []? Not Met: []? Adjusted  6. Patient will report 75% improvement in dizziness with routine ADLs          []? Progressing: []? Met: []? Not Met: []? Adjusted      Overall Progression Towards Functional goals/ Treatment Progress Update:  [] Patient is progressing as expected towards functional goals listed. [] Progression is slowed due to complexities/Impairments listed. [] Progression has been slowed due to co-morbidities.   [x] Plan just implemented, too soon to assess goals progression <30days   [] Goals require adjustment due to lack of progress  [] Patient is not progressing as expected and requires additional follow up with physician  [] Other    Persisting Functional Limitations/Impairments:  []Sitting []Standing   []Walking []Stairs   [x]Transfers []ADLs   [x]Squatting/bending []Kneeling  [x]Housework []Job related tasks  []Driving []Sports/Recreation   []Sleeping []Other:    ASSESSMENT:  See eval  Treatment/Activity Tolerance:  [x] Pt able to complete treatment [] Patient limited by fatique  [] Patient limited by pain  [] Patient limited by other medical complications  [] Other:     Prognosis:  [] Good [] Fair  [] Poor    Patient Requires Follow-up: [x] Yes  [] No    Return to Play:    [x]  N/A   []  Stage 1: Intro to Strength   []  Stage 2: Return to Run and Strength   []  Stage 3: Return to Jump and Strength   []  Stage 4: Dynamic Strength and Agility   []  Stage 5: Sport Specific Training     []  Ready to Return to Play, Meets All Above Stages   []  Not Ready for Return to Sports   Comments:            Plan for next treatment session:    PLAN: See eval. PT1x / week for 4 weeks. [] Continue per plan of care [] Alter current plan (see comments)  [x] Plan of care initiated [] Hold pending MD visit [] Discharge    Electronically signed by: Daina Muro PT, MPT  [id number]      Note: If patient does not return for scheduled/ recommended follow up visits, this note will serve as a discharge from care along with most recent update on progress.

## 2020-11-18 ENCOUNTER — TELEPHONE (OUTPATIENT)
Dept: ENT CLINIC | Age: 60
End: 2020-11-18

## 2020-11-18 NOTE — TELEPHONE ENCOUNTER
----- Message from Olinda Crespo MD sent at 11/17/2020  1:56 PM EST -----  Can we please arrange a follow up appointment for this patient? She needs to be seen to discuss her CT results. There is no follow up scheduled. Thank you.

## 2020-11-19 ENCOUNTER — HOSPITAL ENCOUNTER (OUTPATIENT)
Dept: PHYSICAL THERAPY | Age: 60
Setting detail: THERAPIES SERIES
Discharge: HOME | End: 2020-11-19
Payer: COMMERCIAL

## 2020-11-19 PROCEDURE — 97110 THERAPEUTIC EXERCISES: CPT | Performed by: PHYSICAL THERAPIST

## 2020-11-19 PROCEDURE — 97530 THERAPEUTIC ACTIVITIES: CPT | Performed by: PHYSICAL THERAPIST

## 2020-11-19 NOTE — FLOWSHEET NOTE
[facility]  Phone: [phone]  Fax: [phone]    Physical Therapy Treatment Note/ Progress Report:     Date:  2020    Patient Name:  Bridget Zepeda    :  1960  MRN: [id number]  Restrictions/Precautions:    Medical/Treatment Diagnosis Information:  · Diagnosis: R42 Dizziness  Treatment Diagnosis: dizziness and balance deficits d/t vestibular involvement  Insurance/Certification information:  PT Insurance Information: [facility]  25V PCY   $30 copay  Physician Information:  Referring Practitioner: Laurie Sharma MD  Plan of care signed (Y/N): []  Yes  [x]  No     Date of Patient follow up with Physician:      Progress Report: []  Yes  [x]  No     Date Range for reporting period:  Beginnin/10/20  Ending:     Progress report due (10 Rx/or 30 days whichever is less):     Recertification due (POC duration/ or 90 days whichever is less):      Visit # Insurance Allowable Auth required? Date Range   2 25 []  Yes  []  No PCY     Latex Allergy:  [x]NO      []YES  Preferred Language for Healthcare:   [x]English       []other:    Functional Scale:       Date assessed:  LEFS: raw score =18 ; dysfunction = 19%   11/10/20    Pain level:  0/10     HX: Patient stated chief complaint: Patient reports a h/o vertigo but about 3 weeks ago a different dizziness onset. Her s/s onset with sit-stand with head turns, rajendra noted in her swivel chair at work. She also has s/s of orthostatic hypotension. Pt was referred to a specialist, who referred her to PT     SUBJECTIVE:  Pt feels the HEP is helping her, less dizziness noted. Pt is able to drive, some light sensitivity.     OBJECTIVE: See eval      RESTRICTIONS/PRECAUTIONS:     Exercises/Interventions:     Therapeutic Exercise (78605)  Resistance / level Sets/sec Reps Notes / Cues          Foam:  NBOS EC  NBOS  w s/s head turns  Squat  Tandem  SLS    30\"  30\"    30\"   1  1  10  1x L/R  add Sit-stand with [place]   10  dizzy after, slight LOB at rep 7   Walking around cones:  Stare at tball  Head turns: s/s, up/down   Zig zag thru cones 2 lengths ea  low lighting used                               Therapeutic Activities (06690)       Squats with Red Tball  Ball in front , move head to follow ball  Ball s to s, move follows ball     10  10    Wall in hallway with head turns   2 lengths ea S/s, up/down          HEP:   gaze stabilization  S/s, up/down  EC NBOS,semi tandem, tandem in the corner    1' ea  20\"     3 Added striped background to HEP   Neuromuscular Re-ed (89036)       Rocker board   add                  Manual Intervention ([phone])                                                     Pt. Education:  -pt educated on diagnosis, prognosis and expectations for rehab  -all pt questions were answered    Home Exercise Program:  11/17: add striped background to gaze stabiliation  11/10: gaze stabilization as above    Therapeutic Exercise and NMR EXR  [x] (14501) Provided verbal/tactile cueing for activities related to strengthening, flexibility, endurance, ROM for improvements in LE, proximal hip, and core control with self care, mobility, lifting, ambulation.  [] (25407) Provided verbal/tactile cueing for activities related to improving balance, coordination, kinesthetic sense, posture, motor skill, proprioception  to assist with LE, proximal hip, and core control in self care, mobility, lifting, ambulation and eccentric single leg control.      NMR and Therapeutic Activities:    [x] (00290 or 61530) Provided verbal/tactile cueing for activities related to improving balance, coordination, kinesthetic sense, posture, motor skill, proprioception and motor activation to allow for proper function of core, proximal hip and LE with self care and ADLs  [] (34667) Gait Re-education- Provided training and instruction to the patient for proper LE, core and proximal hip recruitment and positioning and eccentric Short Term Goals: To be achieved in: 2 weeks  1. Independent in HEP and progression per patient tolerance, in order to prevent re-injury. [x]? Progressing: []? Met: []? Not Met: []? Adjusted  2. Patient will have a decrease in dizziness/imbalance/symptoms to facilitate improvement in movement, function, balance and ADLs as indicated by Functional Deficits. [x]? Progressing: []? Met: []? Not Met: []? Adjusted     Long Term Goals: To be achieved in: 4-6 weeks  1. Disability index score of 10% or less for the DHI/DGI/Johnson/FGA to assist with reaching prior level of function. [x]? Progressing: []? Met: []? Not Met: []? Adjusted  2. Patient will demonstrate increased cervical AROM to WNL, joint mobility WNL to allow for proper joint functioning as indicated by patients Functional Deficits. [x]? Progressing: []? Met: []? Not Met: []? Adjusted  3. Patient will demonstrate negative oculomotor special testing/positional testing as indicated by patients Functional Deficits. [x]? Progressing: []? Met: []? Not Met: []? Adjusted  4. Patient will return to functional activities including sit-stand with a head turn without increased symptoms or restriction. [x]? Progressing: []? Met: []? Not Met: []? Adjusted  5. Pt will stand tandem x 30 seconds with eyes closed without onset of dizziness for improved ADL tolerance    [x]? Progressing: []? Met: []? Not Met: []? Adjusted  6. Patient will report 75% improvement in dizziness with routine ADLs          [x]? Progressing: []? Met: []? Not Met: []? Adjusted      Overall Progression Towards Functional goals/ Treatment Progress Update:  [] Patient is progressing as expected towards functional goals listed. [] Progression is slowed due to complexities/Impairments listed. [] Progression has been slowed due to co-morbidities.   [x] Plan just implemented, too soon to assess goals progression <30days   [] Goals require adjustment due to lack of progress  [] Patient is not progressing as expected and requires additional follow up with physician  [] Other    Persisting Functional Limitations/Impairments:  []Sitting []Standing   []Walking []Stairs   [x]Transfers []ADLs   [x]Squatting/bending []Kneeling  [x]Housework []Job related tasks  []Driving []Sports/Recreation   []Sleeping []Other:    ASSESSMENT:  See eval  Treatment/Activity Tolerance:  [x] Pt able to complete treatment [] Patient limited by fatique  [] Patient limited by pain  [] Patient limited by other medical complications  [] Other:     Prognosis:  [] Good [] Fair  [] Poor    Patient Requires Follow-up: [x] Yes  [] No    Return to Play:    [x]  N/A   []  Stage 1: Intro to Strength   []  Stage 2: Return to Run and Strength   []  Stage 3: Return to Jump and Strength   []  Stage 4: Dynamic Strength and Agility   []  Stage 5: Sport Specific Training     []  Ready to Return to Play, Meets All Above Stages   []  Not Ready for Return to Sports   Comments:            Plan for next treatment session:    PLAN: See eval. PT1x / week for 4 weeks. [x] Continue per plan of care [] Alter current plan (see comments)  [] Plan of care initiated [] Hold pending MD visit [] Discharge    Electronically signed by: Damon Melchor PT, MPT  [id number]      Note: If patient does not return for scheduled/ recommended follow up visits, this note will serve as a discharge from care along with most recent update on progress.

## 2020-11-23 ENCOUNTER — HOSPITAL ENCOUNTER (OUTPATIENT)
Dept: PHYSICAL THERAPY | Age: 60
Setting detail: THERAPIES SERIES
Discharge: HOME | End: 2020-11-23
Payer: COMMERCIAL

## 2020-11-23 NOTE — PROGRESS NOTES
[facility]    Physical Therapy  Cancellation/No-show Note  Patient Name:  Kit Lezama  :  1960   Date:  2020    Cancelled visits to date: 0  No-shows to date: 1    For today's appointment patient:  []  Cancelled  []  Rescheduled appointment  [x]  No-show       Reason given by patient:  []  Patient ill  []  Conflicting appointment  []  No transportation    []  Conflict with work  []  No reason given  []  Other:     Comments:      Phone call information:   []  Phone call made today to patient at _ time at number provided:      []  Patient answered, conversation as follows:    []  Patient did not answer, message left as follows:  [x]  Phone call not made today  []  Phone call not needed - pt contacted us to cancel and provided reason for cancellation.      Electronically signed by:  Dain Merlin, PT

## 2020-11-25 ENCOUNTER — OFFICE VISIT (OUTPATIENT)
Dept: ENT CLINIC | Age: 60
End: 2020-11-25
Payer: COMMERCIAL

## 2020-11-25 VITALS
DIASTOLIC BLOOD PRESSURE: 79 MMHG | TEMPERATURE: 96.4 F | HEIGHT: 66 IN | SYSTOLIC BLOOD PRESSURE: 126 MMHG | HEART RATE: 82 BPM | BODY MASS INDEX: 36 KG/M2 | WEIGHT: 224 LBS

## 2020-11-25 PROCEDURE — 99213 OFFICE O/P EST LOW 20 MIN: CPT | Performed by: STUDENT IN AN ORGANIZED HEALTH CARE EDUCATION/TRAINING PROGRAM

## 2020-11-25 PROCEDURE — 3017F COLORECTAL CA SCREEN DOC REV: CPT | Performed by: STUDENT IN AN ORGANIZED HEALTH CARE EDUCATION/TRAINING PROGRAM

## 2020-11-25 PROCEDURE — 1036F TOBACCO NON-USER: CPT | Performed by: STUDENT IN AN ORGANIZED HEALTH CARE EDUCATION/TRAINING PROGRAM

## 2020-11-25 PROCEDURE — G8484 FLU IMMUNIZE NO ADMIN: HCPCS | Performed by: STUDENT IN AN ORGANIZED HEALTH CARE EDUCATION/TRAINING PROGRAM

## 2020-11-25 PROCEDURE — G8427 DOCREV CUR MEDS BY ELIG CLIN: HCPCS | Performed by: STUDENT IN AN ORGANIZED HEALTH CARE EDUCATION/TRAINING PROGRAM

## 2020-11-25 PROCEDURE — G8417 CALC BMI ABV UP PARAM F/U: HCPCS | Performed by: STUDENT IN AN ORGANIZED HEALTH CARE EDUCATION/TRAINING PROGRAM

## 2020-11-25 PROCEDURE — 31231 NASAL ENDOSCOPY DX: CPT | Performed by: STUDENT IN AN ORGANIZED HEALTH CARE EDUCATION/TRAINING PROGRAM

## 2020-11-25 NOTE — PATIENT INSTRUCTIONS
Patient Education        Thyroid Surgery: Before Your Surgery  What is thyroid surgery? Thyroid surgery takes out part or all of your thyroid gland. The gland makes hormones that control how your body makes and uses energy (metabolism). A doctor may take out part or all of the gland when it gets too big, doesn't work right, or has a growth. Most growths or lumps in this gland are benign. This means they aren't cancer. This surgery may be needed for problems such as thyroid nodules, thyroid cancer, and hyperthyroidism. During your surgery, your doctor may take out a lump or nodule. A doctor will look at the tissue under a microscope. · If the sample gives a clear answer for your problem, your doctor may leave the rest of your thyroid. Or you may have all of it removed. · If the answer isn't clear, your doctor may leave the thyroid. More tests may be done on the tissue. When the test results come back, you may need surgery to take out the rest of your thyroid. The doctor will take out the tissue, lump, or tumor through a cut (incision) in the front of your neck. You will likely have a tube, called a drain, in your neck. It lets fluid out of the cut. The drain is most often taken out before you go home. You may go home on the same day. Or you may stay one or more nights in the hospital after surgery. You may return to work or your normal routine in 1 to 2 weeks. This depends on whether you need more treatment and how you feel. It may also depend on the kind of work you do. Your doctor will check your incision in about a week. You may need to take thyroid medicine. If you have thyroid cancer, you may need to have radioactive iodine therapy. Your doctor will talk to you about what happens next. Follow-up care is a key part of your treatment and safety. Be sure to make and go to all appointments, and call your doctor if you are having problems.  It's also a good idea to know your test results and keep a list of the medicines you take. How do you prepare for surgery? Surgery can be stressful. This information will help you understand what you can expect. And it will help you safely prepare for surgery. Preparing for surgery  · Be sure you have someone to take you home. Anesthesia and pain medicine will make it unsafe for you to drive or get home on your own. · Understand exactly what surgery is planned, along with the risks, benefits, and other options. · Tell your doctor ALL the medicines, vitamins, supplements, and herbal remedies you take. Some may increase the risk of problems during your procedure. Your doctor will tell you if you should stop taking any of them before the procedure and how soon to do it. · If you take aspirin or some other blood thinner, ask your doctor if you should stop taking it before your surgery. Make sure that you understand exactly what your doctor wants you to do. These medicines increase the risk of bleeding. · Make sure your doctor and the hospital have a copy of your advance directive. If you don't have one, you may want to prepare one. It lets others know your health care wishes. It's a good thing to have before any type of surgery or procedure. What happens on the day of surgery? · Follow the instructions exactly about when to stop eating and drinking. If you don't, your surgery may be canceled. If your doctor told you to take your medicines on the day of surgery, take them with only a sip of water.     · Take a bath or shower before you come in for your surgery. Do not apply lotions, perfumes, deodorants, or nail polish.     · Do not shave the surgical site yourself.     · Take off all jewelry and piercings. And take out contact lenses, if you wear them. At the hospital or surgery center   · Bring a picture ID.     · The area for surgery is often marked to make sure there are no errors.     · You will be kept comfortable and safe by your anesthesia provider.  You will be

## 2020-11-25 NOTE — PROGRESS NOTES
Sample Type 10/30/2020 HIRA   Final    Performed on 10/30/2020 SEE BELOW   Final    Performed on POC        DRUG/FOOD ALLERGIES: Patient has no known allergies. CURRENT MEDICATIONS  Prior to Admission medications    Medication Sig Start Date End Date Taking? Authorizing Provider   Biotin 10 MG CAPS Take by mouth    Historical Provider, MD   loratadine (CLARITIN) 10 MG capsule Take 10 mg by mouth daily    Historical Provider, MD   fluticasone (FLONASE) 50 MCG/ACT nasal spray 2 sprays by Each Nostril route 2 times daily 10/22/20   Supriya Mayes MD   b complex vitamins capsule Take by mouth    Historical Provider, MD   vitamin D 1000 UNITS CAPS Take by mouth    Historical Provider, MD       REVIEW OF SYSTEMS  The following systems were reviewed and revealed the following in addition to any already discussed in the HPI:    CONSTITUTIONAL: no weight loss, no fever, no night sweats, no chills  EYES: no vision changes, no blurry vision  EARS: no changes in hearing, no otalgia  NOSE: no epistaxis, no rhinorrhea  RESPIRATORY: no  Difficulty breathing, no shortness of breath  CV: no chest pain, no Peripheral vascular disease  HEME: No coagulation disorder, no Bleeding disorder  NEURO: no TIA or stroke-like symptoms  SKIN: No new rashes in the head and neck, no recent skin cancers  MOUTH: No new ulcers, no recent teeth infections  GASTROINTESTINAL: No diarrhea, stomach pain  PSYCH: No anxiety, no depression      PHYSICAL EXAM  LMP 09/21/2016     GENERAL: No Acute Distress, Alert and Oriented, no Hoarseness, strong voice  EYES: EOMI, Anti-icteric  HENT:   Head: Normocephalic and atraumatic.    Face:  Symmetric, facial nerve intact, no sinus tenderness  Right Ear: Normal external ear, normal external auditory canal, intact tympanic membrane with normal mobility and aerated middle ear  Left Ear: Normal external ear, normal external auditory canal, intact tympanic membrane with normal mobility and aerated middle ear  Mouth/Oral Cavity:  normal lips, Uvula is midline, no mucosal lesions, no trismus, normal dentition, normal salivary quality/flow  Oropharynx/Larynx:  normal oropharynx, normal tonsils; patient did not tolerate mirror exam due to excessive gag reflex  Nose:Normal external nasal appearance. Anterior rhinoscopy shows a normal septum. Normal turbinates. Normal mucosa   NECK: Normal range of motion, no thyromegaly, trachea is midline, no lymphadenopathy, no neck masses, no crepitus  CHEST: Normal respiratory effort, no retractions, breathing comfortably  SKIN: No rashes, normal appearing skin, no evidence of skin lesions/tumors  Neuro:  cranial nerve II-XII intact; normal gait  Cardio:  no edema          PROCEDURE  Nasal Endoscopy (CPT code 83422)    Preop: chronic rhinitis  Postop: Same    Verbal consent was received. After topical anesthesia and decongestion had been obtained using aerosolized 1% lidocaine and oxymetazoline, a 45 degree rigid endoscope was placed into both nares with the patient in a sitting position.  The following was observed:    Right Nasal Cavity and Paranasal Sinuses:  Polyp score = 0 (0 = no polyps, 1 = small polyps in middle meatus not reaching below the inferior border of the middle mor, 2 = polyps reaching below the middle border of the middle turbinate, 3= large polyps reaching the lower border of the inferior turbinate or polyps medial to the middle mor, 4= large polyps causing almost complete congestion/obstruction of the interior meatus)  Edema score = 1 (0 = absent, 1 = mild, 2 = severe)  Discharge score = 1 (0 = no discharge, 1 = clear thin discharge, 2 = thick purulent discharge)    Left Nasal Cavity and Paranasal Sinuses:    Polyp score = 0 (0 = no polyps, 1 = small polyps in middle meatus not reaching below the inferior border of the middle mor, 2 = polyps reaching below the middle border of the middle turbinate, 3= large polyps reaching the lower border of the inferior turbinate or polyps medial to the middle mor, 4= large polyps causing almost complete congestion/obstruction of the interior meatus)  Edema score = 1 (0 = absent, 1 = mild, 2 = severe)  Discharge score = 1 (0 = no discharge, 1 = clear thin discharge, 2 = thick purulent discharge)    Septum: intact and deviated to the left significantly. Other:   -The inferior and middle turbinates were examined. The middle meatus, and sphenoethmoid recess was examined bilaterally.    -There is evidence of significant sinonasal inflammation with a large septal deviation to the left. There is inferior turbinate hypertrophy. There are polypoid changes of the right middle turbinate.   -There were no complications. Tolerated well without complication. I attest that I was present for and did the entire procedure myself. ASSESSMENT/PLAN  1. Goiter    2. Dizziness    3. Nasal obstruction    4. Deviated nasal septum    5. Hypertrophy of both inferior nasal turbinates    Patient is a very pleasant 80-year-old female here today for evaluation of multiple complaints. Regarding her dizziness, I did perform Ru-Hallpike and she had some symptoms with right-sided head turn. I did not appreciate any nystagmus and she did not have definite vertigo, however this did cause her to be symptomatic. At present, I do not suspect any carotid pathology as a source of her dizziness. While this does sound like it could have a component of orthostatic hypotension, I have asked her to be evaluated by my colleagues in vestibular therapy to see if they have anything that could help her. Regarding her thyroid goiter, the patient has a family history of both her sister and mother requiring surgical movable of their thyroid goiter. She has not had any recent imaging in her system. Her TSH was normal 12/2019. 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   There is evidence of tracheal deviation to the right. There is no evidence of central adenopathy. The patient has evidence of significant sinonasal inflammation on exam.  In addition she has significant deviation to the left as well as turbinate hypertrophy. She has been using fluticasone 2 sprays twice daily as well as sinus irrigations. Despite this she had evidence of left maxillary sinus opacification that was total.  I feel that she would be a good candidate for left-sided maxillary antrostomy, anterior ethmoidectomy, inferior turbinate reduction and septoplasty. We will follow up with me in approximately 1 month to discuss surgery. Her son is getting  January 2 and she would like to have everything done after this. I have performed a head and neck physical exam personally or was physically present during the key or critical portions of the service. Medical Decision Making:   The following items were considered in medical decision making:  Independent review of images  Review / order clinical lab tests  Review / order radiology tests  Decision to obtain old records

## 2020-12-16 ENCOUNTER — OFFICE VISIT (OUTPATIENT)
Dept: PRIMARY CARE CLINIC | Age: 60
End: 2020-12-16
Payer: COMMERCIAL

## 2020-12-16 PROCEDURE — 99211 OFF/OP EST MAY X REQ PHY/QHP: CPT | Performed by: NURSE PRACTITIONER

## 2020-12-16 PROCEDURE — G8417 CALC BMI ABV UP PARAM F/U: HCPCS | Performed by: NURSE PRACTITIONER

## 2020-12-16 PROCEDURE — G8428 CUR MEDS NOT DOCUMENT: HCPCS | Performed by: NURSE PRACTITIONER

## 2020-12-16 NOTE — PATIENT INSTRUCTIONS

## 2020-12-17 LAB — SARS-COV-2, NAA: NOT DETECTED

## 2020-12-21 ENCOUNTER — OFFICE VISIT (OUTPATIENT)
Dept: ENT CLINIC | Age: 60
End: 2020-12-21
Payer: COMMERCIAL

## 2020-12-21 VITALS — TEMPERATURE: 97.3 F | DIASTOLIC BLOOD PRESSURE: 86 MMHG | SYSTOLIC BLOOD PRESSURE: 133 MMHG | HEART RATE: 86 BPM

## 2020-12-21 PROCEDURE — G8417 CALC BMI ABV UP PARAM F/U: HCPCS | Performed by: STUDENT IN AN ORGANIZED HEALTH CARE EDUCATION/TRAINING PROGRAM

## 2020-12-21 PROCEDURE — 1036F TOBACCO NON-USER: CPT | Performed by: STUDENT IN AN ORGANIZED HEALTH CARE EDUCATION/TRAINING PROGRAM

## 2020-12-21 PROCEDURE — G8484 FLU IMMUNIZE NO ADMIN: HCPCS | Performed by: STUDENT IN AN ORGANIZED HEALTH CARE EDUCATION/TRAINING PROGRAM

## 2020-12-21 PROCEDURE — G8427 DOCREV CUR MEDS BY ELIG CLIN: HCPCS | Performed by: STUDENT IN AN ORGANIZED HEALTH CARE EDUCATION/TRAINING PROGRAM

## 2020-12-21 PROCEDURE — 3017F COLORECTAL CA SCREEN DOC REV: CPT | Performed by: STUDENT IN AN ORGANIZED HEALTH CARE EDUCATION/TRAINING PROGRAM

## 2020-12-21 PROCEDURE — 99213 OFFICE O/P EST LOW 20 MIN: CPT | Performed by: STUDENT IN AN ORGANIZED HEALTH CARE EDUCATION/TRAINING PROGRAM

## 2020-12-21 NOTE — PROGRESS NOTES
Mandy      Patient Name: [address] [place] [address] Record Number:  [id number]  Primary Care Physician:  Matthew Jacobs MD  Date of Consultation: 10/22/2020    Chief Complaint:        HISTORY OF PRESENT ILLNESS  Sobeida Orozco is a(n) 61 y.o. female who presents for multiple complaints. The first issue is related to episodes of dizziness. This will occur when she goes from a lying to sitting position or from a sitting to standing. She denies any worsening when she turns on her side. She notes that she had vertigo in the past it was intermittent but this is resolved and this is different than that. She has not lost consciousness. She does not have any nausea or vomiting. She will occasionally get headaches. Patient also notes difficulties with breathing through her nose. She has had this for many years. Nothing has made her symptoms better or worse. She has not had anything that has improved her symptoms. She has frequent nasal drainage, sometimes this is clear recently it was yellowish-green. She has not use any nasal sprays. She tried a Lea pot in the past but did not like it. She gets frequent sinus infections. Patient also notes a history of a thyroid goiter. She has trouble swallowing and occasionally difficulties with breathing due to this. Her last thyroid ultrasound was many years ago. I attempted to independently interpret this, however it was not available for review. Most recent TSH was normal at 1.14. She notes a history of thyroid goiter in both her mom and her sister. Both of them have had surgery to remove it in the past.    Update 11/25/2020:    Patient presents today for follow-up. She is still having some intermittent difficulties with dysphagia and occasional shortness of breath. She is still getting nasal drainage. She has been using her sprays and irrigations as prescribed.     Patient also has evidence of a large multinodular goiter on her CT scan which I independently reviewed 10/30/2020. Additionally there is complete opacification of the left maxillary sinus. There is septal deviation and inferior turbinate hypertrophy. Update 12/21/2020:    Patient presents today for follow-up. She is still using the nasal spray and occasionally the sinus irrigations. She has not had a sinus infection. Overall she is doing okay from a sinus and throat standpoint. She is set to leave for [place] the day after [place] to be with her son for his marriage in the beginning of January. I independently reviewed the patients past medical history, past surgical history, and social history. They are unremarkable except as noted in the HPI and below. The patient denies any family history related to the current complaint, and they deny any family history of bleeding disorders or difficulties with anesthesia unless noted below. Patient Active Problem List   Diagnosis    Allergic rhinitis    Postnasal drip     Past Surgical History:   Procedure Laterality Date    ANKLE FUSION Right 2014    Jake Kasal DPM    OVARY SURGERY  2016    polyps - Nelly Samaniego MD     Family History   Problem Relation Age of Onset    Diabetes Maternal Aunt     Diabetes Maternal Uncle     Diabetes Maternal Grandmother     Stroke Maternal Grandmother     Diabetes Maternal Grandfather     Heart Disease Maternal Grandfather     High Blood Pressure Maternal Grandfather     Diabetes Maternal Cousin     Cancer Neg Hx      Social History     Tobacco Use    Smoking status: Never Smoker    Smokeless tobacco: Never Used   Substance Use Topics    Alcohol use:  Yes     Alcohol/week: 2.0 standard drinks     Types: 2 Glasses of wine per week    Drug use: No        Office Visit on 12/16/2020   Component Date Value Ref Range Status    SARS-CoV-2, SANJAY 12/16/2020 NOT DETECTED  NOT DETECTED Final    Comment: The SARS-CoV-2 assay is a real-time RT-PCR test intended for the  qualitative detection of nucleic acid from the SARS-CoV-2 in  respiratory specimens from individuals. Testing is limited to the  guidelines of FDA Emergency Use Authorization FDA for performing  SARS-CoV-2 testing. A Not Detected result does not preclude the possibility of SARS-CoV-2  infection since the adequacy of sample collection and/or low viral  burden may result in the presence of viral nucleic acids below the  analytical sensitivity of this test method. Test results should be used  with caution and in conjunction with other clinical and laboratory data  in making a diagnosis. Performed at: "[facility]  [address] [place] [address], [address] [place] [address]  : Lyle Cheema MD          DRUG/FOOD ALLERGIES: Patient has no known allergies. CURRENT MEDICATIONS  Prior to Admission medications    Medication Sig Start Date End Date Taking?  Authorizing Provider   Biotin 10 MG CAPS Take by mouth   Yes Historical Provider, MD   loratadine (CLARITIN) 10 MG capsule Take 10 mg by mouth daily   Yes Historical Provider, MD   b complex vitamins capsule Take by mouth   Yes Historical Provider, MD   vitamin D 1000 UNITS CAPS Take by mouth   Yes Historical Provider, MD   fluticasone (FLONASE) 50 MCG/ACT nasal spray 2 sprays by Each Nostril route 2 times daily  Patient not taking: Reported on 12/21/2020 10/22/20   Dulce Vallecillo MD       REVIEW OF SYSTEMS  The following systems were reviewed and revealed the following in addition to any already discussed in the HPI:    CONSTITUTIONAL: no weight loss, no fever, no night sweats, no chills  EYES: no vision changes, no blurry vision  EARS: no changes in hearing, no otalgia  NOSE: no epistaxis, no rhinorrhea  RESPIRATORY: no  Difficulty breathing, no shortness of breath  CV: no chest pain, no Peripheral vascular disease  HEME: No coagulation disorder, no Bleeding disorder  NEURO: no TIA or stroke-like symptoms  SKIN: No new rashes in the head and neck, no recent skin cancers  MOUTH: No new ulcers, no recent teeth infections  GASTROINTESTINAL: No diarrhea, stomach pain  PSYCH: No anxiety, no depression      PHYSICAL EXAM  /86 (Site: Left Upper Arm)   Pulse 86   Temp 97.3 °F (36.3 °C) (Temporal)   LMP 09/21/2016     GENERAL: No Acute Distress, Alert and Oriented, no Hoarseness, strong voice  EYES: EOMI, Anti-icteric  HENT:   Head: Normocephalic and atraumatic. Face:  Symmetric, facial nerve intact, no sinus tenderness  Right Ear: Normal external ear, normal external auditory canal, intact tympanic membrane with normal mobility and aerated middle ear  Left Ear: Normal external ear, normal external auditory canal, intact tympanic membrane with normal mobility and aerated middle ear  Mouth/Oral Cavity:  normal lips, Uvula is midline, no mucosal lesions, no trismus, normal dentition, normal salivary quality/flow  Oropharynx/Larynx:  normal oropharynx, normal tonsils; patient did not tolerate mirror exam due to excessive gag reflex  Nose:Normal external nasal appearance. Anterior rhinoscopy shows a normal septum. Normal turbinates. Normal mucosa   NECK: Normal range of motion, no thyromegaly, trachea is midline, no lymphadenopathy, no neck masses, no crepitus  CHEST: Normal respiratory effort, no retractions, breathing comfortably  SKIN: No rashes, normal appearing skin, no evidence of skin lesions/tumors  Neuro:  cranial nerve II-XII intact; normal gait  Cardio:  no edema          PROCEDURE  Nasal Endoscopy (CPT code 89840) from last visit    Preop: chronic rhinitis  Postop: Same    Verbal consent was received. After topical anesthesia and decongestion had been obtained using aerosolized 1% lidocaine and oxymetazoline, a 45 degree rigid endoscope was placed into both nares with the patient in a sitting position.  The following was observed:    Right Nasal Cavity and Paranasal Sinuses:  Polyp score = 0 (0 = no polyps, 1 = small polyps in middle meatus not reaching below the inferior border of the middle mor, 2 = polyps reaching below the middle border of the middle turbinate, 3= large polyps reaching the lower border of the inferior turbinate or polyps medial to the middle mor, 4= large polyps causing almost complete congestion/obstruction of the interior meatus)  Edema score = 1 (0 = absent, 1 = mild, 2 = severe)  Discharge score = 1 (0 = no discharge, 1 = clear thin discharge, 2 = thick purulent discharge)    Left Nasal Cavity and Paranasal Sinuses:    Polyp score = 0 (0 = no polyps, 1 = small polyps in middle meatus not reaching below the inferior border of the middle mor, 2 = polyps reaching below the middle border of the middle turbinate, 3= large polyps reaching the lower border of the inferior turbinate or polyps medial to the middle mor, 4= large polyps causing almost complete congestion/obstruction of the interior meatus)  Edema score = 1 (0 = absent, 1 = mild, 2 = severe)  Discharge score = 1 (0 = no discharge, 1 = clear thin discharge, 2 = thick purulent discharge)    Septum: intact and deviated to the left significantly. Other:   -The inferior and middle turbinates were examined. The middle meatus, and sphenoethmoid recess was examined bilaterally.    -There is evidence of significant sinonasal inflammation with a large septal deviation to the left. There is inferior turbinate hypertrophy. There are polypoid changes of the right middle turbinate.   -There were no complications. Tolerated well without complication. I attest that I was present for and did the entire procedure myself. ASSESSMENT/PLAN  1. Goiter    2. Dizziness    3. Nasal obstruction    4. Deviated nasal septum    5. Hypertrophy of both inferior nasal turbinates    Patient is a very pleasant 44-year-old female here today for evaluation of multiple complaints.   Regarding her dizziness, I did perform Ru-Hallpike at her initial visit and she had some symptoms with right-sided head turn. I did not appreciate any nystagmus and she did not have definite vertigo, however this did cause her to be symptomatic. At present, I do not suspect any carotid pathology as a source of her dizziness. While this does sound like it could have a component of orthostatic hypotension, I have asked her to be evaluated by my colleagues in vestibular therapy to see if they have anything that could help her. Regarding her thyroid goiter, the patient has a family history of both her sister and mother requiring surgical movable of their thyroid goiter. She has not had any recent imaging in our system. Her TSH was normal 12/2019. 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 There is evidence of tracheal deviation to the right. There is no evidence of central adenopathy. The patient has had evidence of significant sinonasal inflammation on exam.  In addition she has significant deviation to the left as well as turbinate hypertrophy. She has been using fluticasone 2 sprays twice daily as well as sinus irrigations. Despite this she had evidence of left maxillary sinus opacification that was total.  I feel that she would be a good candidate for left-sided maxillary antrostomy, anterior ethmoidectomy, inferior turbinate reduction and septoplasty to address her symptoms of nasal airway obstruction and frequent sinus infections. I rereviewed her CT scan and it shows mild bony hyperostosis of the surrounding maxillary sinus wall in comparison to the right and no definite erosion of the tooth root into the sinus. It is unclear whether her maxillary sinus opacification represents obstruction of the [facility] complex on the left, and odontogenic type infection or less likely fungal ball or even IP.     We discussed different options for her including doing just the sinus surgery or just the thyroid surgery or doing nothing at all.  I offered her a second opinion from one of my partners if she had any questions or concerns. She would like to think about this. She will contact our office to let me know if she would like to meet again to discuss her issues. I did ask her to please call our office should she develop any sinus infections and we can prescribe her an antibiotic or provide any other help that we can. Medical Decision Making:   The following items were considered in medical decision making:  Independent review of images  Review / order clinical lab tests  Review / order radiology tests  Decision to obtain old records

## 2021-04-08 ENCOUNTER — HOSPITAL ENCOUNTER (OUTPATIENT)
Dept: MAMMOGRAPHY | Age: 61
Discharge: HOME | End: 2021-04-08
Payer: COMMERCIAL

## 2021-04-08 VITALS — HEIGHT: 65 IN | BODY MASS INDEX: 36.65 KG/M2 | WEIGHT: 220 LBS

## 2021-04-08 DIAGNOSIS — Z12.31 ENCOUNTER FOR SCREENING MAMMOGRAM FOR BREAST CANCER: ICD-10-CM

## 2021-04-08 PROCEDURE — 77067 SCR MAMMO BI INCL CAD: CPT

## 2021-07-19 ENCOUNTER — OFFICE VISIT (OUTPATIENT)
Dept: PRIMARY CARE CLINIC | Age: 61
End: 2021-07-19
Payer: COMMERCIAL

## 2021-07-19 VITALS
SYSTOLIC BLOOD PRESSURE: 126 MMHG | TEMPERATURE: 98 F | RESPIRATION RATE: 16 BRPM | BODY MASS INDEX: 37.81 KG/M2 | DIASTOLIC BLOOD PRESSURE: 88 MMHG | OXYGEN SATURATION: 98 % | HEART RATE: 88 BPM | WEIGHT: 227.2 LBS

## 2021-07-19 DIAGNOSIS — R19.7 DIARRHEA, UNSPECIFIED TYPE: Primary | ICD-10-CM

## 2021-07-19 DIAGNOSIS — R10.13 EPIGASTRIC CRAMPING: ICD-10-CM

## 2021-07-19 DIAGNOSIS — R19.7 DIARRHEA, UNSPECIFIED TYPE: ICD-10-CM

## 2021-07-19 LAB
A/G RATIO: 1.5 (ref 1.1–2.2)
ALBUMIN SERPL-MCNC: 4.2 G/DL (ref 3.4–5)
ALP BLD-CCNC: 75 U/L (ref 40–129)
ALT SERPL-CCNC: 11 U/L (ref 10–40)
ANION GAP SERPL CALCULATED.3IONS-SCNC: 11 MMOL/L (ref 3–16)
AST SERPL-CCNC: 18 U/L (ref 15–37)
BASOPHILS ABSOLUTE: 0.1 K/UL (ref 0–0.2)
BASOPHILS RELATIVE PERCENT: 1.2 %
BILIRUB SERPL-MCNC: <0.2 MG/DL (ref 0–1)
BUN BLDV-MCNC: 12 MG/DL (ref 7–20)
CALCIUM SERPL-MCNC: 9 MG/DL (ref 8.3–10.6)
CHLORIDE BLD-SCNC: 108 MMOL/L (ref 99–110)
CO2: 22 MMOL/L (ref 21–32)
CREAT SERPL-MCNC: 0.7 MG/DL (ref 0.6–1.2)
EOSINOPHILS ABSOLUTE: 0.2 K/UL (ref 0–0.6)
EOSINOPHILS RELATIVE PERCENT: 3.8 %
GFR AFRICAN AMERICAN: >60
GFR NON-AFRICAN AMERICAN: >60
GLOBULIN: 2.8 G/DL
GLUCOSE BLD-MCNC: 80 MG/DL (ref 70–99)
HCT VFR BLD CALC: 39.9 % (ref 36–48)
HEMOGLOBIN: 13.4 G/DL (ref 12–16)
LYMPHOCYTES ABSOLUTE: 1.7 K/UL (ref 1–5.1)
LYMPHOCYTES RELATIVE PERCENT: 29.3 %
MCH RBC QN AUTO: 31.9 PG (ref 26–34)
MCHC RBC AUTO-ENTMCNC: 33.5 G/DL (ref 31–36)
MCV RBC AUTO: 95.4 FL (ref 80–100)
MONOCYTES ABSOLUTE: 0.4 K/UL (ref 0–1.3)
MONOCYTES RELATIVE PERCENT: 7.6 %
NEUTROPHILS ABSOLUTE: 3.3 K/UL (ref 1.7–7.7)
NEUTROPHILS RELATIVE PERCENT: 58.1 %
PDW BLD-RTO: 13.2 % (ref 12.4–15.4)
PLATELET # BLD: 284 K/UL (ref 135–450)
PMV BLD AUTO: 9.3 FL (ref 5–10.5)
POTASSIUM SERPL-SCNC: 4.6 MMOL/L (ref 3.5–5.1)
RBC # BLD: 4.18 M/UL (ref 4–5.2)
SODIUM BLD-SCNC: 141 MMOL/L (ref 136–145)
TOTAL PROTEIN: 7 G/DL (ref 6.4–8.2)
WBC # BLD: 5.7 K/UL (ref 4–11)

## 2021-07-19 PROCEDURE — 1036F TOBACCO NON-USER: CPT | Performed by: INTERNAL MEDICINE

## 2021-07-19 PROCEDURE — 99214 OFFICE O/P EST MOD 30 MIN: CPT | Performed by: INTERNAL MEDICINE

## 2021-07-19 PROCEDURE — G8427 DOCREV CUR MEDS BY ELIG CLIN: HCPCS | Performed by: INTERNAL MEDICINE

## 2021-07-19 PROCEDURE — 3017F COLORECTAL CA SCREEN DOC REV: CPT | Performed by: INTERNAL MEDICINE

## 2021-07-19 PROCEDURE — G8417 CALC BMI ABV UP PARAM F/U: HCPCS | Performed by: INTERNAL MEDICINE

## 2021-07-19 RX ORDER — FAMOTIDINE 40 MG/1
40 TABLET, FILM COATED ORAL DAILY
COMMUNITY
Start: 2021-06-17 | End: 2022-06-01 | Stop reason: ALTCHOICE

## 2021-07-19 RX ORDER — MONTELUKAST SODIUM 10 MG/1
10 TABLET ORAL NIGHTLY
COMMUNITY
End: 2021-08-31

## 2021-07-19 RX ORDER — DICYCLOMINE HYDROCHLORIDE 10 MG/1
10 CAPSULE ORAL 3 TIMES DAILY PRN
Qty: 15 CAPSULE | Refills: 0 | Status: SHIPPED | OUTPATIENT
Start: 2021-07-19 | End: 2021-08-31

## 2021-07-19 RX ORDER — FLUTICASONE FUROATE, UMECLIDINIUM BROMIDE AND VILANTEROL TRIFENATATE 100; 62.5; 25 UG/1; UG/1; UG/1
POWDER RESPIRATORY (INHALATION)
COMMUNITY
Start: 2021-07-01 | End: 2021-08-16 | Stop reason: CLARIF

## 2021-07-19 SDOH — ECONOMIC STABILITY: FOOD INSECURITY: WITHIN THE PAST 12 MONTHS, THE FOOD YOU BOUGHT JUST DIDN'T LAST AND YOU DIDN'T HAVE MONEY TO GET MORE.: NEVER TRUE

## 2021-07-19 SDOH — ECONOMIC STABILITY: FOOD INSECURITY: WITHIN THE PAST 12 MONTHS, YOU WORRIED THAT YOUR FOOD WOULD RUN OUT BEFORE YOU GOT MONEY TO BUY MORE.: NEVER TRUE

## 2021-07-19 ASSESSMENT — PATIENT HEALTH QUESTIONNAIRE - PHQ9
SUM OF ALL RESPONSES TO PHQ QUESTIONS 1-9: 0
SUM OF ALL RESPONSES TO PHQ QUESTIONS 1-9: 0
2. FEELING DOWN, DEPRESSED OR HOPELESS: 0
SUM OF ALL RESPONSES TO PHQ QUESTIONS 1-9: 0
1. LITTLE INTEREST OR PLEASURE IN DOING THINGS: 0
SUM OF ALL RESPONSES TO PHQ9 QUESTIONS 1 & 2: 0

## 2021-07-19 ASSESSMENT — SOCIAL DETERMINANTS OF HEALTH (SDOH): HOW HARD IS IT FOR YOU TO PAY FOR THE VERY BASICS LIKE FOOD, HOUSING, MEDICAL CARE, AND HEATING?: NOT HARD AT ALL

## 2021-07-19 NOTE — PATIENT INSTRUCTIONS
1. Diarrhea, unspecified type  - stop Trelegy Ellipta inhaler since diarrhea started with starting Trelegy Ellipta inhaler  - Comprehensive Metabolic Panel; Future  - CBC Auto Differential; Future  - Referral to Ting Masterson MD, Gastroenterology, [facility]  - GI Bacterial Pathogens By PCR; Future    2. Epigastric cramping  - Comprehensive Metabolic Panel; Future  - CBC Auto Differential; Future  - dicyclomine (BENTYL) 10 MG capsule; Take 1 capsule by mouth 3 times daily as needed (abdominal cramping)  Dispense: 15 capsule;  Refill: 0  - Referral to  ROBYN Smith MD, Gastroenterology, [facility]  - US ABDOMEN COMPLETE; Future

## 2021-07-19 NOTE — PROGRESS NOTES
Collins Oswald   Date ofBirth:  1960    Date of Visit:  7/19/2021    Chief Complaint   Patient presents with    Abdominal Pain    Diarrhea     Patient says it is not diarrhea but is more loose than normal.3-4 loose stools a day happening for a few weeks       HPI  Patient complains of  2 weeks or longer history of diarrhea with watery stool x 2 days and now loose stool 3-4 times per day. Patient denies rectal bleeding. Patient takes Immodium 1-2 times per day. Patient states she started Trelegy 2 weeks  ago. Patient started Famotidine and Singulair 4 weeks ago. Patient states her diarrhea started after she started using Trelegy inhaler. Patient was diagnosed allergy induced asthma and laryngopharyngeal reflux. Patient complains of abdominal cramping epigastric x 1 weeks. Patient states if she eats something cramping starts within a couple of hours and diarrhea starts    Patient denies nausea, vomiting, fever, and rectal bleeding. Patient has follow up with Allergist on Thursday. Review of Systems   Constitutional: Negative for appetite change, chills, fatigue and fever. HENT: Negative for congestion, postnasal drip, rhinorrhea, sinus pressure and sore throat. Eyes: Negative for visual disturbance. Respiratory: Negative for cough, chest tightness, shortness of breath and wheezing. Cardiovascular: Negative for chest pain, palpitations and leg swelling. Gastrointestinal: Positive for abdominal pain and diarrhea. Negative for abdominal distention, blood in stool, constipation, nausea and vomiting. Genitourinary: Negative for dysuria, frequency and hematuria. Musculoskeletal: Negative for arthralgias, back pain and myalgias. Skin: Negative for rash. Neurological: Negative for dizziness, tremors, syncope, weakness, light-headedness, numbness and headaches. Psychiatric/Behavioral: Negative for dysphoric mood and sleep disturbance. The patient is not nervous/anxious. No Known Allergies  Outpatient Medications Marked as Taking for the 7/19/21 encounter (Office Visit) with Zeferino Bustillos MD   Medication Sig Dispense Refill    famotidine (PEPCID) 40 MG tablet       TRELEGY ELLIPTA 100-62.5-25 MCG/INH AEPB       montelukast (SINGULAIR) 10 MG tablet Take 10 mg by mouth nightly      Biotin 10 MG CAPS Take by mouth      loratadine (CLARITIN) 10 MG capsule Take 10 mg by mouth daily      b complex vitamins capsule Take by mouth      vitamin D 1000 UNITS CAPS Take by mouth           Vitals:    07/19/21 1430   BP: 126/88   Pulse: 88   Resp: 16   Temp: 98 °F (36.7 °C)   SpO2: 98%   Weight: 227 lb 3.2 oz (103.1 kg)     Body mass index is 37.81 kg/m². Physical Exam  Nursing note reviewed. Constitutional:       General: She is not in acute distress. Appearance: Normal appearance. She is well-developed. Eyes:      General: Lids are normal.      Extraocular Movements: Extraocular movements intact. Conjunctiva/sclera: Conjunctivae normal.      Pupils: Pupils are equal, round, and reactive to light. Neck:      Thyroid: Thyromegaly present. Vascular: No carotid bruit. Cardiovascular:      Rate and Rhythm: Normal rate and regular rhythm. Heart sounds: Normal heart sounds, S1 normal and S2 normal. No murmur heard. No friction rub. No gallop. Pulmonary:      Effort: Pulmonary effort is normal. No respiratory distress. Breath sounds: Normal breath sounds. No wheezing, rhonchi or rales. Abdominal:      General: Bowel sounds are normal. There is no distension. Palpations: Abdomen is soft. Tenderness: There is no abdominal tenderness. Musculoskeletal:      Cervical back: Neck supple. Right lower leg: No edema. Left lower leg: No edema. Lymphadenopathy:      Head:      Right side of head: No submandibular adenopathy. Left side of head: No submandibular adenopathy.    Neurological:      Mental Status: She is alert and oriented to person, place, and time. Psychiatric:         Mood and Affect: Mood normal.           No results found for this visit on 07/19/21. Lab Review   No visits with results within 6 Month(s) from this visit. Latest known visit with results is:   Office Visit on 12/16/2020   Component Date Value    SARS-CoV-2, SANJAY 12/16/2020 NOT DETECTED          Assessment/Plan     1. Diarrhea, unspecified type  - stop Trelegy Ellipta inhaler since diarrhea started with starting Trelegy Ellipta inhaler  - Comprehensive Metabolic Panel; Future  - CBC Auto Differential; Future  - Referral to Dorian Capone MD, Gastroenterology, [facility]  - GI Bacterial Pathogens By PCR; Future    2. Epigastric cramping  - Comprehensive Metabolic Panel; Future  - CBC Auto Differential; Future  - dicyclomine (BENTYL) 10 MG capsule; Take 1 capsule by mouth 3 times daily as needed (abdominal cramping)  Dispense: 15 capsule; Refill: 0  - Referral to  Dorian Capone MD, Gastroenterology, [facility]  - US ABDOMEN COMPLETE; Future      Discussed medications with patient, who voiced understanding of their use and indications. All questions answered. Return in about 1 week (around 7/26/2021) for diarrhea, abdominal cramping, and results.

## 2021-07-23 DIAGNOSIS — R19.7 DIARRHEA, UNSPECIFIED TYPE: ICD-10-CM

## 2021-07-25 LAB — GI BACTERIAL PATHOGENS BY PCR: NORMAL

## 2021-07-26 PROBLEM — R19.7 DIARRHEA: Status: ACTIVE | Noted: 2021-07-26

## 2021-07-26 PROBLEM — R10.13 EPIGASTRIC CRAMPING: Status: ACTIVE | Noted: 2021-07-26

## 2021-07-26 ASSESSMENT — ENCOUNTER SYMPTOMS
WHEEZING: 0
NAUSEA: 0
VOMITING: 0
SORE THROAT: 0
SINUS PRESSURE: 0
COUGH: 0
SHORTNESS OF BREATH: 0
BACK PAIN: 0
ABDOMINAL DISTENTION: 0
ABDOMINAL PAIN: 1
CHEST TIGHTNESS: 0
DIARRHEA: 1
RHINORRHEA: 0
BLOOD IN STOOL: 0
CONSTIPATION: 0

## 2021-08-07 ENCOUNTER — NURSE TRIAGE (OUTPATIENT)
Dept: OTHER | Facility: CLINIC | Age: 61
End: 2021-08-07

## 2021-08-07 NOTE — TELEPHONE ENCOUNTER
Received call from Hayley at [facility]- JANNET with Red Flag Complaint. Brief description of triage: Patient calling for concerns for high heart rate over the past hour. States that her apple watch has been buzzing a warning that her heart rate is over 120 while she is just sitting still. Denies any other symptoms. Triage indicates for patient to see HCP within 4 hours. Able to assist patient with location of nearest urgent care facility. Care advice provided, patient verbalizes understanding; denies any other questions or concerns; instructed to call back for any new or worsening symptoms. Attention Provider: Thank you for allowing me to participate in the care of your patient. The patient was connected to triage in response to information provided to the [facility]. Please do not respond through this encounter as the response is not directed to a shared pool. Reason for Disposition   Age > 60 years (Exception: brief heart beat symptoms that went away and now feels well)    Answer Assessment - Initial Assessment Questions  1. DESCRIPTION: \"Please describe your heart rate or heart beat that you are having\" (e.g., fast/slow, regular/irregular, skipped or extra beats, \"palpitations\")      Apple watch is buzzing that her heart rate is high:  Over 120 bpm    2. ONSET: \"When did it start? \" (Minutes, hours or days)       About last hour    3. DURATION: \"How long does it last\" (e.g., seconds, minutes, hours)      Has occurred a couple times, lasting a few minutes    4. PATTERN \"Does it come and go, or has it been constant since it started? \"  \"Does it get worse with exertion? \"   \"Are you feeling it now? \"      Comes and goes    5. TAP: \"Using your hand, can you tap out what you are feeling on a chair or table in front of you, so that I can hear? \" (Note: not all patients can do this)        Not really feeling anything    6. HEART RATE: \"Can you tell me your heart rate? \" \"How many beats in 15 seconds? \" (Note: not all patients can do this)        108, 5 minutes later 80, 5 minutes later she checked with BP monitor:  125/101  pulse,  Recheck 5 minutes later:  144/82  pulse. 7. RECURRENT SYMPTOM: \"Have you ever had this before? \" If so, ask: \"When was the last time? \" and \"What happened that time? \"       No, has had her watch for about 4 years    8. CAUSE: \"What do you think is causing the palpitations? \"      Unsure. Singular stopped 2 weeks ago and restarted on Monday    9. CARDIAC HISTORY: \"Do you have any history of heart disease? \" (e.g., heart attack, angina, bypass surgery, angioplasty, arrhythmia)       No    10. OTHER SYMPTOMS: \"Do you have any other symptoms? \" (e.g., dizziness, chest pain, sweating, difficulty breathing)        No dizziness, no chest pain, no usual sweating, shortness of breath with exertion (allergy induced asthma per provider) and denies any worsening of symptoms since evaluation    11. PREGNANCY: \"Is there any chance you are pregnant? \" \"When was your last menstrual period? \"        n/a    Protocols used: HEART RATE AND HEARTBEAT QUESTIONS-ADULT-

## 2021-08-08 ENCOUNTER — TELEPHONE (OUTPATIENT)
Dept: PRIMARY CARE CLINIC | Age: 61
End: 2021-08-08

## 2021-08-08 DIAGNOSIS — E05.90 HYPERTHYROIDISM: Primary | ICD-10-CM

## 2021-08-15 NOTE — PROGRESS NOTES
ENCOUNTER DATE: 8/16/2021     NAME: Bridget Zepeda   AGE: 61 y.o. GENDER: female   YOB: 1960    Patient Active Problem List   Diagnosis    Allergic rhinitis    Postnasal drip    Diarrhea    Epigastric cramping    Dysphagia    Nontoxic multinodular goiter    Obstructive sleep apnea    Hyperthyroidism    Tachycardia      No Known Allergies  Current Outpatient Medications on File Prior to Visit   Medication Sig Dispense Refill    atenolol (TENORMIN) 50 MG tablet Take 50 mg by mouth daily      famotidine (PEPCID) 40 MG tablet       montelukast (SINGULAIR) 10 MG tablet Take 10 mg by mouth nightly      dicyclomine (BENTYL) 10 MG capsule Take 1 capsule by mouth 3 times daily as needed (abdominal cramping) 15 capsule 0    Biotin 10 MG CAPS Take by mouth      loratadine (CLARITIN) 10 MG capsule Take 10 mg by mouth daily      b complex vitamins capsule Take by mouth      vitamin D 1000 UNITS CAPS Take by mouth       No current facility-administered medications on file prior to visit. Social History     Tobacco Use    Smoking status: Never Smoker    Smokeless tobacco: Never Used   Substance Use Topics    Alcohol use: Yes     Alcohol/week: 2.0 standard drinks     Types: 2 Glasses of wine per week      CARE TEAM  Patient Care Team:  Fatimah Alcocer MD as PCP - General (Internal Medicine)  Fatimah Alcocer MD as PCP - [facility] Empaneled Provider  Adam Rivas MD as Consulting Physician (Obstetrics & Gynecology)  Noelle Garrett DPM as Consulting Physician (Podiatry)  Shae James MD as Consulting Physician (Sleep Medicine)  Nicolasa Morgan MD as Consulting Physician (Gastroenterology)    Chief Complaint   Patient presents with    Hypertension     follow  up on  er visit   [place]       HPI:   Patient is here for ED follow up   Seen at [facility] 8/8/21 for complaints of increased HR  She was seen at Urgent Care on 8/7/21 and BP (170/102) and HR (140) were elevated.  She was asymptomatic. Here TSH was 0.05 and T4 4.97  She was given IVF and atenolol PO and dc home with rx for atenolol. THYROID:  Dx with goiter years ago. Did have biopsy recently and was benign. Thyroid labs have been normal, until now. Saw ENT. Determined to have mild compressive symptoms from goiter. rec allergy testing and sleep study. Also told she may need to have surgery. Also saw allergist. Dx with allergy induced asthma. Recently started on allergy med and inhaler. Has fu apt in 2 wks with allergist.   Still having trouble swallowing. Has been referred to endo. Has apt scheduled. BP:  Home readings stable. Usually 117-120/80s. HR: 100s. Feels tired. Tolerating atenolol ok. ROS:  Review of Systems   Constitutional: Positive for fatigue. Negative for activity change, appetite change, chills and fever. HENT: Negative for congestion, ear pain, postnasal drip, rhinorrhea, sinus pressure, sinus pain, sore throat and trouble swallowing. Respiratory: Negative for cough, chest tightness, shortness of breath and wheezing. Cardiovascular: Positive for palpitations. Negative for chest pain and leg swelling. Gastrointestinal: Negative for abdominal pain, diarrhea, nausea and vomiting. Endocrine: Positive for polydipsia and polyuria. Negative for cold intolerance and heat intolerance. Genitourinary: Negative for difficulty urinating. Musculoskeletal: Negative for myalgias. Skin: Negative for color change, pallor and rash. Neurological: Positive for dizziness and headaches. Negative for weakness and light-headedness. Hematological: Negative for adenopathy. VITALS:  /80   Pulse 92   Temp 98.6 °F (37 °C) (Oral)   Ht 5' 5\" (1.651 m)   Wt 223 lb (101.2 kg)   LMP 09/21/2016   SpO2 98%   BMI 37.11 kg/m²      PE:  Physical Exam  Vitals and nursing note reviewed. Constitutional:       General: She is not in acute distress. Appearance: Normal appearance. She is well-developed and normal weight. She is not diaphoretic. HENT:      Head: Normocephalic and atraumatic. Neck:      Vascular: No carotid bruit. Cardiovascular:      Rate and Rhythm: Normal rate and regular rhythm. Heart sounds: Normal heart sounds. No murmur heard. No friction rub. Pulmonary:      Effort: Pulmonary effort is normal. No respiratory distress. Breath sounds: Normal breath sounds. No wheezing, rhonchi or rales. Abdominal:      General: Abdomen is flat. There is no distension. Palpations: Abdomen is soft. Lymphadenopathy:      Cervical: No cervical adenopathy. Skin:     General: Skin is warm and dry. Coloration: Skin is not pale. Findings: No erythema or rash. Neurological:      Mental Status: She is alert and oriented to person, place, and time. Motor: No weakness. Gait: Gait normal.   Psychiatric:         Mood and Affect: Mood normal.         Behavior: Behavior normal.      ASSESSMENT/PLAN:  1. Hyperthyroidism  Recent ED visit reviewed. Recent ENT notes reviewed. Patient symptomatic. Has endo apt scheduled. Will see if endo is able to see patient sooner. 2. Tachycardia  Improved on atenolol. Will continue to monitor HR/BP at home. 3. Elevated BP without diagnosis of hypertension  Improved on atenolol. Will continue to monitor HR/BP at home. No follow-ups on file.      Electronically signed by FLAKITA Valdes CNP on 8/16/2021 at 4:25 PM

## 2021-08-16 ENCOUNTER — OFFICE VISIT (OUTPATIENT)
Dept: PRIMARY CARE CLINIC | Age: 61
End: 2021-08-16
Payer: COMMERCIAL

## 2021-08-16 VITALS
SYSTOLIC BLOOD PRESSURE: 126 MMHG | BODY MASS INDEX: 37.15 KG/M2 | DIASTOLIC BLOOD PRESSURE: 80 MMHG | WEIGHT: 223 LBS | HEIGHT: 65 IN | OXYGEN SATURATION: 98 % | HEART RATE: 92 BPM | TEMPERATURE: 98.6 F

## 2021-08-16 DIAGNOSIS — R03.0 ELEVATED BP WITHOUT DIAGNOSIS OF HYPERTENSION: ICD-10-CM

## 2021-08-16 DIAGNOSIS — E05.90 HYPERTHYROIDISM: Primary | ICD-10-CM

## 2021-08-16 DIAGNOSIS — R00.0 TACHYCARDIA: ICD-10-CM

## 2021-08-16 PROCEDURE — 1036F TOBACCO NON-USER: CPT | Performed by: NURSE PRACTITIONER

## 2021-08-16 PROCEDURE — G8427 DOCREV CUR MEDS BY ELIG CLIN: HCPCS | Performed by: NURSE PRACTITIONER

## 2021-08-16 PROCEDURE — G8417 CALC BMI ABV UP PARAM F/U: HCPCS | Performed by: NURSE PRACTITIONER

## 2021-08-16 PROCEDURE — 99214 OFFICE O/P EST MOD 30 MIN: CPT | Performed by: NURSE PRACTITIONER

## 2021-08-16 PROCEDURE — 3017F COLORECTAL CA SCREEN DOC REV: CPT | Performed by: NURSE PRACTITIONER

## 2021-08-16 RX ORDER — ATENOLOL 50 MG/1
50 TABLET ORAL DAILY
COMMUNITY
Start: 2021-08-08 | End: 2021-08-31 | Stop reason: SDUPTHER

## 2021-08-16 ASSESSMENT — ENCOUNTER SYMPTOMS
TROUBLE SWALLOWING: 0
DIARRHEA: 0
WHEEZING: 0
SINUS PRESSURE: 0
RHINORRHEA: 0
COLOR CHANGE: 0
COUGH: 0
SINUS PAIN: 0
VOMITING: 0
CHEST TIGHTNESS: 0
ABDOMINAL PAIN: 0
SHORTNESS OF BREATH: 0
SORE THROAT: 0
NAUSEA: 0

## 2021-08-16 ASSESSMENT — PATIENT HEALTH QUESTIONNAIRE - PHQ9
SUM OF ALL RESPONSES TO PHQ QUESTIONS 1-9: 0
1. LITTLE INTEREST OR PLEASURE IN DOING THINGS: 0
SUM OF ALL RESPONSES TO PHQ9 QUESTIONS 1 & 2: 0
SUM OF ALL RESPONSES TO PHQ QUESTIONS 1-9: 0
SUM OF ALL RESPONSES TO PHQ QUESTIONS 1-9: 0
2. FEELING DOWN, DEPRESSED OR HOPELESS: 0

## 2021-08-16 NOTE — PATIENT INSTRUCTIONS
Continue on atenolol 50mg daily for now  Continue to monitor HR and BP at home. Will see if endo can move up apt.

## 2021-08-16 NOTE — Clinical Note
New dx hyperthyroidism. Symptomatic. New patient apt with you 9/24. Any chance you could see her sooner?

## 2021-08-31 ENCOUNTER — OFFICE VISIT (OUTPATIENT)
Dept: ENDOCRINOLOGY | Age: 61
End: 2021-08-31
Payer: COMMERCIAL

## 2021-08-31 VITALS
OXYGEN SATURATION: 97 % | DIASTOLIC BLOOD PRESSURE: 82 MMHG | WEIGHT: 226 LBS | HEIGHT: 65 IN | HEART RATE: 67 BPM | SYSTOLIC BLOOD PRESSURE: 120 MMHG | BODY MASS INDEX: 37.65 KG/M2

## 2021-08-31 DIAGNOSIS — E05.90 HYPERTHYROIDISM: Primary | ICD-10-CM

## 2021-08-31 DIAGNOSIS — E04.2 MULTINODULAR GOITER: ICD-10-CM

## 2021-08-31 DIAGNOSIS — E55.9 VITAMIN D DEFICIENCY: ICD-10-CM

## 2021-08-31 PROCEDURE — G8417 CALC BMI ABV UP PARAM F/U: HCPCS | Performed by: INTERNAL MEDICINE

## 2021-08-31 PROCEDURE — G8427 DOCREV CUR MEDS BY ELIG CLIN: HCPCS | Performed by: INTERNAL MEDICINE

## 2021-08-31 PROCEDURE — 99204 OFFICE O/P NEW MOD 45 MIN: CPT | Performed by: INTERNAL MEDICINE

## 2021-08-31 RX ORDER — CETIRIZINE HYDROCHLORIDE 10 MG/1
10 TABLET ORAL DAILY
COMMUNITY

## 2021-08-31 RX ORDER — ATENOLOL 25 MG/1
25 TABLET ORAL DAILY
Qty: 90 TABLET | Refills: 0 | Status: SHIPPED | OUTPATIENT
Start: 2021-08-31 | End: 2021-11-29

## 2021-08-31 RX ORDER — CYANOCOBALAMIN (VITAMIN B-12) 5000 MCG
TABLET,DISINTEGRATING ORAL DAILY
COMMUNITY

## 2021-08-31 NOTE — PROGRESS NOTES
Patient ID: Mellissa Webster is a 64 y.o. female    Chief Complaint: hyperthyroidism   Referred by Rachel Duong MD     HPI:   Mellissa Webster is here for initial evaluation of hyperthyroidism     Her apple watch showed heart rate >200 in reading book, resting. In ER /100's. TSH 0.05, FT4 4.97 - Aug 2021     CT neck - Oct 2020  Multinodular goiter. On the left, dominant nodule measures 2.6 x 3.0 cm mid to lower pole Upper pole nodule measures 17 x 14 mm  Right thyroid nodule measures 17 x 19 x 27 mm. Left lobe is larger than the right with mild substernal extension and tracheal deviation to the right. On atenolol 50 mg daily     Energy levels are low   She has stable weight   Skin is dry and peeling   Usually hot, has excessive sweating . Shortness of breat with walking She has been told to have allergy induced asthma. Occasional palpitations    Denies depression or anxiety ,remors, sleep issues   No neck pain, she has compressive neck symptoms for 2-3 years   Menopause around 62     Family history of thyroid disorder: sister has thyroidectomy (no cancer). Mother had thyroid disease.      No neck radiation  No Recent iodine loading in form of contrast material for diagnostic studies/cardiac cath  Biotin 5000 mcg daily Food supplements, herbs or medications including Biotin  No Recent URTI      The following portions of the patient's history were reviewed and updated as appropriate:       Family History   Problem Relation Age of Onset    Diabetes Maternal Aunt     Diabetes Maternal Uncle     Diabetes Maternal Grandmother     Stroke Maternal Grandmother     Diabetes Maternal Grandfather     Heart Disease Maternal Grandfather     High Blood Pressure Maternal Grandfather     Diabetes Maternal Cousin     Cancer Neg Hx             Social History     Socioeconomic History    Marital status:      Spouse name: Not on file    Number of children: Not on file    Years of education: Not on file    Highest education level: Not on file   Occupational History    Not on file   Tobacco Use    Smoking status: Never Smoker    Smokeless tobacco: Never Used   Vaping Use    Vaping Use: Never used   Substance and Sexual Activity    Alcohol use: Yes     Alcohol/week: 2.0 standard drinks     Types: 2 Glasses of wine per week    Drug use: No    Sexual activity: Yes     Partners: Male   Other Topics Concern    Not on file   Social History Narrative    Not on file     Social Determinants of Health     Financial Resource Strain: Low Risk     Difficulty of Paying Living Expenses: Not hard at all   Food Insecurity: No Food Insecurity    Worried About [address] [facility] in the Last Year: Never true    [address] [place] [address] in the Last Year: Never true   Transportation Needs:     Lack of Transportation (Medical):      Lack of Transportation (Non-Medical):    Physical Activity:     Days of Exercise per Week:     Minutes of Exercise per Session:    Stress:     Feeling of Stress :    Social Connections:     Frequency of Communication with Friends and Family:     Frequency of Social Gatherings with Friends and Family:     Attends [employer] Services:     Active Member of Clubs or Organizations:     Attends Club or Organization Meetings:     Marital Status:    Intimate Partner Violence:     Fear of Current or Ex-Partner:     Emotionally Abused:     Physically Abused:     Sexually Abused:            Past Medical History:   Diagnosis Date    Allergic rhinitis     Dizziness     GERD (gastroesophageal reflux disease)     Goiter     Osteoarthritis     Right ankle effusion     2014    Sinusitis     Sleep apnea     Tinnitus     TMJ dysfunction     Visual impairment     wears glasses         Past Surgical History:   Procedure Laterality Date    ANKLE FUSION Right 2014    Omar PLUMMER    OVARY SURGERY  2016    [facility] - Lance Storey MD         No Known Allergies        Current Outpatient Medications:    Triamcinolone Acetonide (NASACORT AQ NA), by Nasal route, Disp: , Rfl:     cetirizine (ZYRTEC ALLERGY) 10 MG tablet, Take 10 mg by mouth daily, Disp: , Rfl:     Cyanocobalamin (VITAMIN B-12) 5000 MCG TBDP, Take by mouth daily, Disp: , Rfl:     ALBUTEROL IN, Inhale into the lungs, Disp: , Rfl:     atenolol (TENORMIN) 25 MG tablet, Take 1 tablet by mouth daily, Disp: 90 tablet, Rfl: 0    famotidine (PEPCID) 40 MG tablet, Take 40 mg by mouth daily , Disp: , Rfl:     Biotin 10 MG CAPS, Take by mouth daily , Disp: , Rfl:     vitamin D 1000 UNITS CAPS, Take 5,000 Int'l Units by mouth daily , Disp: , Rfl:       Review of Systems:  Constitutional: Negative for fever, chills. HENT: Negative for congestion, ear pain, rhinorrhea,  sore throat and trouble swallowing. Eyes: Negative for photophobia, redness, itching. Respiratory: Negative for cough, shortness of breath and sputum. Cardiovascular: Negative for chest pain and leg swelling. Gastrointestinal: Negative for nausea, vomiting, abdominal pain, diarrhea, constipation. Endocrine: Negative for polydipsia, polyphagia and polyuria. Genitourinary: Negative for dysuria, urgency, frequency, hematuria and flank pain. Musculoskeletal: has lower back pain. Negative for myalgias,  arthralgias. Skin/Nail: Negative for rash, itching. Normal nails. Neurological: Negative for seizures, light-headedness, numbness and headaches. Hematological/ Lymph nodes: Negative for adenopathy. Does not bruise/bleed easily. Psychiatric/Behavioral: Negative for suicidal ideas and decreased concentration. Physical Exam:  /82 (Site: Left Upper Arm, Position: Sitting, Cuff Size: Large Adult)   Pulse 67   Ht 5' 5\" (1.651 m)   Wt 226 lb (102.5 kg)   LMP 09/21/2016   SpO2 97%   BMI 37.61 kg/m²   Constitutional: Patient is oriented to person, place, and time. Patient appears well-developed and well-nourished. HENT:    Head: Normocephalic and atraumatic. 0.2  0.0 - 0.6 K/uL Final    Basophils Absolute 07/19/2021 0.1  0.0 - 0.2 K/uL Final    Sodium 07/19/2021 141  136 - 145 mmol/L Final    Potassium 07/19/2021 4.6  3.5 - 5.1 mmol/L Final    Chloride 07/19/2021 108  99 - 110 mmol/L Final    CO2 07/19/2021 22  21 - 32 mmol/L Final    Anion Gap 07/19/2021 11  3 - 16 Final    Glucose 07/19/2021 80  70 - 99 mg/dL Final    BUN 07/19/2021 12  7 - 20 mg/dL Final    CREATININE 07/19/2021 0.7  0.6 - 1.2 mg/dL Final    GFR Non- 07/19/2021 >60  >60 Final    GFR  07/19/2021 >60  >60 Final    Calcium 07/19/2021 9.0  8.3 - 10.6 mg/dL Final    Total Protein 07/19/2021 7.0  6.4 - 8.2 g/dL Final    Albumin 07/19/2021 4.2  3.4 - 5.0 g/dL Final    Albumin/Globulin Ratio 07/19/2021 1.5  1.1 - 2.2 Final    Total Bilirubin 07/19/2021 <0.2  0.0 - 1.0 mg/dL Final    Alkaline Phosphatase 07/19/2021 75  40 - 129 U/L Final    ALT 07/19/2021 11  10 - 40 U/L Final    AST 07/19/2021 18  15 - 37 U/L Final    Globulin 07/19/2021 2.8  g/dL Final   Office Visit on 12/16/2020   Component Date Value Ref Range Status    SARS-CoV-2, SANJAY 12/16/2020 NOT DETECTED  NOT DETECTED Final   Hospital Outpatient Visit on 10/30/2020   Component Date Value Ref Range Status    POC Creatinine 10/30/2020 0.8  0.6 - 1.2 mg/dL Final    GFR Non- 10/30/2020 >60  >60 Final    GFR  10/30/2020 >60   Final    Sample Type 10/30/2020 HIRA   Final    Performed on 10/30/2020 SEE BELOW   Final        No results found. Assessment/Plan:     Quoteroller was seen today for consultation and hyperthyroidism. Diagnoses and all orders for this visit:    Hyperthyroidism  -     TSH without Reflex; Future  -     T4, Free; Future  -     T3, Free; Future  -     Thyrotropin receptor antibody; Future  -     External Referral To ENT  -     atenolol (TENORMIN) 25 MG tablet;  Take 1 tablet by mouth daily    Multinodular goiter  -     TSH without Reflex; Future  -     T4, Free; Future  -     T3, Free; Future  -     Thyrotropin receptor antibody; Future  -     External Referral To ENT    Vitamin D deficiency  -     Vitamin D 25 Hydroxy; Future        1: Multinodular goiter with substernal extension     Regardless of thyroid results she will need thyroid surgery due to substernal extension and tracheal deviation     Will check thyroid levels and TAB   First hold off biotin for 3 days and do the blood work     Will do methimazole depending on the results     She has multiple thyroid nodules, FNa is indicated. I donot think it will add any value since she needs thyroidectomy for tracheal deviation/ substernal goiter. Send to ENT, Dr. Hanna De La Cruz rate in 60's Change atenolol to 25 mg daily      Hyperthyroidism medication education   'Reviewed the risks of anti-thyroid drugs including agranulocytosis and hepatitis. If the patient develops a fever, sore throat, jaundice, malaise and/or anorexia, patient is to stop the medicine and call ASAP.     2: Vit D Def   Check levels   C/w Vit D 3 5,000 units daily     RTC in 6 weeks      Thyroid nodules are common, present in 50% of women and 33% of men. Risk of cancer is generally < 10%   FNA thyroid has False negative rates of about 3%. Non diagnostic rate of <10%. Will do genetic testing if needed.         Electronically signed by Vanessa Schwarz MD on 8/31/2021 at 1:42 PM

## 2021-10-11 ENCOUNTER — TELEPHONE (OUTPATIENT)
Dept: ENDOCRINOLOGY | Age: 61
End: 2021-10-11

## 2021-10-11 NOTE — TELEPHONE ENCOUNTER
PT is scheduled for 10/14/21 appt, but she is to have her thyroid removed in November and wants to know does she have to keep her appt with Dr. Xiao Barbosa or should she schedule for after her surgery?

## 2021-11-29 PROBLEM — J45.909 ASTHMA: Status: ACTIVE | Noted: 2021-10-20

## 2021-11-30 ENCOUNTER — OFFICE VISIT (OUTPATIENT)
Dept: ENDOCRINOLOGY | Age: 61
End: 2021-11-30
Payer: COMMERCIAL

## 2021-11-30 VITALS
HEART RATE: 89 BPM | OXYGEN SATURATION: 96 % | HEIGHT: 65 IN | BODY MASS INDEX: 38.95 KG/M2 | WEIGHT: 233.8 LBS | SYSTOLIC BLOOD PRESSURE: 130 MMHG | DIASTOLIC BLOOD PRESSURE: 84 MMHG

## 2021-11-30 DIAGNOSIS — E89.0 POST-SURGICAL HYPOTHYROIDISM: Primary | ICD-10-CM

## 2021-11-30 DIAGNOSIS — E55.9 VITAMIN D DEFICIENCY: ICD-10-CM

## 2021-11-30 DIAGNOSIS — E89.0 POST-SURGICAL HYPOTHYROIDISM: ICD-10-CM

## 2021-11-30 DIAGNOSIS — I10 PRIMARY HYPERTENSION: ICD-10-CM

## 2021-11-30 PROCEDURE — 99214 OFFICE O/P EST MOD 30 MIN: CPT | Performed by: INTERNAL MEDICINE

## 2021-11-30 PROCEDURE — G8484 FLU IMMUNIZE NO ADMIN: HCPCS | Performed by: INTERNAL MEDICINE

## 2021-11-30 PROCEDURE — G8417 CALC BMI ABV UP PARAM F/U: HCPCS | Performed by: INTERNAL MEDICINE

## 2021-11-30 PROCEDURE — G8427 DOCREV CUR MEDS BY ELIG CLIN: HCPCS | Performed by: INTERNAL MEDICINE

## 2021-11-30 PROCEDURE — 3017F COLORECTAL CA SCREEN DOC REV: CPT | Performed by: INTERNAL MEDICINE

## 2021-11-30 PROCEDURE — 1036F TOBACCO NON-USER: CPT | Performed by: INTERNAL MEDICINE

## 2021-11-30 RX ORDER — LEVOTHYROXINE SODIUM 0.15 MG/1
TABLET ORAL
COMMUNITY
Start: 2021-11-03 | End: 2021-11-30

## 2021-11-30 RX ORDER — ATENOLOL 25 MG/1
12.5 TABLET ORAL DAILY
Qty: 90 TABLET | Refills: 1 | Status: SHIPPED | OUTPATIENT
Start: 2021-11-30 | End: 2022-06-01 | Stop reason: ALTCHOICE

## 2021-11-30 RX ORDER — LEVOTHYROXINE SODIUM 0.15 MG/1
150 TABLET ORAL DAILY
COMMUNITY
End: 2021-11-30 | Stop reason: SDUPTHER

## 2021-11-30 RX ORDER — LEVOTHYROXINE SODIUM 0.15 MG/1
150 TABLET ORAL DAILY
Qty: 90 TABLET | Refills: 2 | Status: SHIPPED | OUTPATIENT
Start: 2021-11-30 | End: 2021-12-01

## 2021-11-30 NOTE — PROGRESS NOTES
Patient ID: Tyler Tineo is a 64 y.o. female    Chief Complaint: multinodular goiter s/p thyroidectomy, postsurgical hypothyroidism     HPI:   Tyler Tineo is here for initial evaluation of multinodular goiter s/p thyroidectomy, postsurgical hypothyroidism     Her apple watch showed heart rate >200 in reading book, resting. In ER /100's. TSH 0.05, FT4 4.97 - Aug 2021     CT neck - Oct 2020  Multinodular goiter. On the left, dominant nodule measures 2.6 x 3.0 cm mid to lower pole Upper pole nodule measures 17 x 14 mm  Right thyroid nodule measures 17 x 19 x 27 mm. Left lobe is larger than the right with mild substernal extension and tracheal deviation to the right. Repeat TFTs normal - Sep 2021     Saw Dr. Tung Figueroa for goiter causing tracheal deviation   Toxic Multinodular Goiter: s/p total thyroidectomy on 11/3/21. Pathology showed nodular hyperplasia with expanded colloid follicles and adenomatoid nodules, no evidence of malignancy. Post operative PTH 39, Ca 8.9. On atenolol 25 mg daily     Levothyroxine 150 mcg daily in am with water and waits for 60 minutes. Energy levels are better   Weight is gradually going up   Skin is dry and peeling   She has seen an improvement in heat intolerance and excessive sweating . Shortness of breath with walking She has been told to have allergy induced asthma. No palpitations    Denies depression or anxiety ,remors, sleep issues   No neck pain, she has compressive neck symptoms for 2-3 years   Menopause around 62     Family history of thyroid disorder: sister has thyroidectomy (no cancer). Mother had thyroid disease. No neck radiation  No Recent iodine loading in form of contrast material for diagnostic studies/cardiac cath  Off Biotin 5000 mcg daily since surgery.  No other food supplements, herbs or medications including Biotin  No Recent URTI      The following portions of the patient's history were reviewed and updated as appropriate: Family History   Problem Relation Age of Onset    Diabetes Maternal Aunt     Diabetes Maternal Uncle     Diabetes Maternal Grandmother     Stroke Maternal Grandmother     Diabetes Maternal Grandfather     Heart Disease Maternal Grandfather     High Blood Pressure Maternal Grandfather     Diabetes Maternal Cousin     Cancer Neg Hx             Social History     Socioeconomic History    Marital status:      Spouse name: Not on file    Number of children: Not on file    Years of education: Not on file    Highest education level: Not on file   Occupational History    Not on file   Tobacco Use    Smoking status: Never Smoker    Smokeless tobacco: Never Used   Vaping Use    Vaping Use: Never used   Substance and Sexual Activity    Alcohol use: Yes     Alcohol/week: 2.0 standard drinks     Types: 2 Glasses of wine per week    Drug use: No    Sexual activity: Yes     Partners: Male   Other Topics Concern    Not on file   Social History Narrative    Not on file     Social Determinants of Health     Financial Resource Strain: Low Risk     Difficulty of Paying Living Expenses: Not hard at all   Food Insecurity: No Food Insecurity    Worried About [address] in the Last Year: Never true    [address] in the Last Year: Never true   Transportation Needs:     Lack of Transportation (Medical): Not on file    Lack of Transportation (Non-Medical):  Not on file   Physical Activity:     Days of Exercise per Week: Not on file    Minutes of Exercise per Session: Not on file   Stress:     Feeling of Stress : Not on file   Social Connections:     Frequency of Communication with Friends and Family: Not on file    Frequency of Social Gatherings with Friends and Family: Not on file    Attends Worship Services: Not on file    Active Member of Clubs or Organizations: Not on file    Attends Club or Organization Meetings: Not on file    Marital Status: Not on file   Intimate Partner Negative for chest pain and leg swelling. Gastrointestinal: Negative for nausea, vomiting, abdominal pain, diarrhea, constipation. Endocrine: Negative for polydipsia, polyphagia and polyuria. Genitourinary: Negative for dysuria, urgency, frequency, hematuria and flank pain. Musculoskeletal: has lower back pain. Negative for myalgias,  arthralgias. Skin/Nail: Negative for rash, itching. Normal nails. Neurological: Negative for seizures, light-headedness, numbness and headaches. Hematological/ Lymph nodes: Negative for adenopathy. Does not bruise/bleed easily. Psychiatric/Behavioral: Negative for suicidal ideas and decreased concentration. Physical Exam:  /84 (Site: Left Upper Arm, Position: Sitting, Cuff Size: Large Adult)   Pulse 89   Ht 5' 5\" (1.651 m)   Wt 233 lb 12.8 oz (106.1 kg)   LMP 09/21/2016   SpO2 96%   BMI 38.91 kg/m²   Constitutional: Patient is oriented to person, place, and time. Patient appears well-developed and well-nourished. HENT:    Head: Normocephalic and atraumatic. Eyes: Conjunctivae and EOM are normal.    Neck: Normal range of motion. Thyroid absent, scar present in the crease. Cardiovascular: Normal rate, regular rhythm and normal heart sounds. Pulmonary/Chest: Effort normal and breath sounds normal.   Musculoskeletal: Normal range of motion. Neurological: Patient is alert and oriented to person, place, and time. Patient has normal reflexes. No hand tremors. Skin: Skin is warm and dry. Psychiatric: Patient has a normal mood and affect.  Patient behavior is normal.     Lab Review:    Orders Only on 10/20/2021   Component Date Value Ref Range Status    WBC 10/20/2021 5.6  3.8 - 10.8 10E3/uL Final    RBC 10/20/2021 4.48  3.80 - 5.10 10E6/uL Final    Hemoglobin 10/20/2021 13.9  11.7 - 15.5 g/dL Final    Hematocrit 10/20/2021 41.7  35.0 - 45.0 % Final    MCV 10/20/2021 93.0  80.0 - 100.0 fL Final    MCH 10/20/2021 31.0  27.0 - 33.0 pg Final    MCHC 10/20/2021 33.3  32.0 - 36.0 g/dL Final    RDW 10/20/2021 13.9  11.0 - 15.0 % Final    Platelets 87/93/1880 272  140 - 400 10E3/uL Final    MPV 10/20/2021 9.3  7.5 - 11.5 fL Final    Sodium 10/20/2021 139  133 - 146 mmol/L Final    Potassium 10/20/2021 3.8  3.5 - 5.3 mmol/L Final    Chloride 10/20/2021 104  98 - 110 mmol/L Final    CO2 10/20/2021 27  21 - 33 mmol/L Final    Anion Gap 10/20/2021 8  3 - 16 mmol/L Final    BUN 10/20/2021 11  7 - 25 mg/dL Final    CREATININE 10/20/2021 0.70  0.60 - 1.30 mg/dL Final    Glucose 10/20/2021 105* 70 - 100 mg/dL Final    Calcium 10/20/2021 10.0  8.6 - 10.3 mg/dL Final    Phosphorus 10/20/2021 3.1  2.1 - 4.7 mg/dL Final    Albumin 10/20/2021 4.1  3.5 - 5.7 g/dL Final    Osmolality Calc 10/20/2021 288  278 - 305 mOsm/kg Final    GFR, Estimated 10/20/2021 >90  See note. Final    GFR, Estimated 10/20/2021 >90  See note. Final    PTH 10/20/2021 38.0  12.0 - 88.0 pg/mL Final    Antibody Screen 10/20/2021 Negative   Final    ABO Grouping 10/20/2021 B   Final    Rh Factor 10/20/2021 Positive   Final    Vit D, 25-Hydroxy 10/20/2021 32.7  30.0 - 100.0 ng/mL Final   Orders Only on 09/03/2021   Component Date Value Ref Range Status    Vit D, 25-Hydroxy 09/03/2021 58.3  >=30 ng/mL Final    TSH 09/03/2021 0.56  0.27 - 4.20 uIU/mL Final    T4 Free 09/03/2021 0.9  0.9 - 1.8 ng/dL Final    T3, Free 09/03/2021 2.7  2.3 - 4.2 pg/mL Final    Tsh Receptor Ab 09/03/2021 <0.90  <=1.75 IU/L Final   Orders Only on 07/23/2021   Component Date Value Ref Range Status    GI Bacterial Pathogens By PCR 07/23/2021    Final                    Value:No Shigella spp/EIEC DNA detected  No Shiga toxin-producing gene(s) detected  No Campylobacter spp. (jejuni and coli)DNA detected  No Salmonella spp.  DNA detected  Normal Range:  None detected     Orders Only on 07/19/2021   Component Date Value Ref Range Status    WBC 07/19/2021 5.7  4.0 - 11.0 K/uL Final    RBC 07/19/2021 4.18  4.00 - 5.20 M/uL Final    Hemoglobin 07/19/2021 13.4  12.0 - 16.0 g/dL Final    Hematocrit 07/19/2021 39.9  36.0 - 48.0 % Final    MCV 07/19/2021 95.4  80.0 - 100.0 fL Final    MCH 07/19/2021 31.9  26.0 - 34.0 pg Final    MCHC 07/19/2021 33.5  31.0 - 36.0 g/dL Final    RDW 07/19/2021 13.2  12.4 - 15.4 % Final    Platelets 09/94/7063 284  135 - 450 K/uL Final    MPV 07/19/2021 9.3  5.0 - 10.5 fL Final    Neutrophils % 07/19/2021 58.1  % Final    Lymphocytes % 07/19/2021 29.3  % Final    Monocytes % 07/19/2021 7.6  % Final    Eosinophils % 07/19/2021 3.8  % Final    Basophils % 07/19/2021 1.2  % Final    Neutrophils Absolute 07/19/2021 3.3  1.7 - 7.7 K/uL Final    Lymphocytes Absolute 07/19/2021 1.7  1.0 - 5.1 K/uL Final    Monocytes Absolute 07/19/2021 0.4  0.0 - 1.3 K/uL Final    Eosinophils Absolute 07/19/2021 0.2  0.0 - 0.6 K/uL Final    Basophils Absolute 07/19/2021 0.1  0.0 - 0.2 K/uL Final    Sodium 07/19/2021 141  136 - 145 mmol/L Final    Potassium 07/19/2021 4.6  3.5 - 5.1 mmol/L Final    Chloride 07/19/2021 108  99 - 110 mmol/L Final    CO2 07/19/2021 22  21 - 32 mmol/L Final    Anion Gap 07/19/2021 11  3 - 16 Final    Glucose 07/19/2021 80  70 - 99 mg/dL Final    BUN 07/19/2021 12  7 - 20 mg/dL Final    CREATININE 07/19/2021 0.7  0.6 - 1.2 mg/dL Final    GFR Non- 07/19/2021 >60  >60 Final    GFR  07/19/2021 >60  >60 Final    Calcium 07/19/2021 9.0  8.3 - 10.6 mg/dL Final    Total Protein 07/19/2021 7.0  6.4 - 8.2 g/dL Final    Albumin 07/19/2021 4.2  3.4 - 5.0 g/dL Final    Albumin/Globulin Ratio 07/19/2021 1.5  1.1 - 2.2 Final    Total Bilirubin 07/19/2021 <0.2  0.0 - 1.0 mg/dL Final    Alkaline Phosphatase 07/19/2021 75  40 - 129 U/L Final    ALT 07/19/2021 11  10 - 40 U/L Final    AST 07/19/2021 18  15 - 37 U/L Final    Globulin 07/19/2021 2.8  g/dL Final   Office Visit on 12/16/2020   Component Date Value Ref Range Status    SARS-CoV-2, SANJAY 12/16/2020 NOT DETECTED  NOT DETECTED Final        No results found. Assessment/Plan:     Nina Ibrahim was seen today for thyroid problem. 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 Instructed to take daily with water on an empty stomach without concomitant vitamins or calcium or other medicine. Wait for 30-45 minutes before eating. If patient is confident they missed a day of pills, they can take the missed dose with their next tablet (only for levothyroxine).       2: Vit D Def   32.7 - Oct 2021   C/w Vit D 3 5,000 units daily     For blood pressure and heart rate  Change atenolol 12.5 mg     Thyroid numbers today       RTC in 6 months with TFTs, vit D, CMP, PTH       Electronically signed by Alycia Redmond MD on 11/30/2021 at 2:38 PM

## 2021-12-01 DIAGNOSIS — E55.9 VITAMIN D DEFICIENCY: ICD-10-CM

## 2021-12-01 DIAGNOSIS — E89.0 POST-SURGICAL HYPOTHYROIDISM: ICD-10-CM

## 2021-12-01 LAB
T4 FREE: 2 NG/DL (ref 0.9–1.8)
TSH SERPL DL<=0.05 MIU/L-ACNC: 0.03 UIU/ML (ref 0.27–4.2)

## 2021-12-01 RX ORDER — LEVOTHYROXINE SODIUM 137 UG/1
150 TABLET ORAL DAILY
Qty: 90 TABLET | Refills: 1 | Status: SHIPPED | OUTPATIENT
Start: 2021-12-01 | End: 2022-06-01

## 2022-03-08 DIAGNOSIS — E89.0 POST-SURGICAL HYPOTHYROIDISM: ICD-10-CM

## 2022-03-08 DIAGNOSIS — E55.9 VITAMIN D DEFICIENCY: ICD-10-CM

## 2022-03-09 RX ORDER — LEVOTHYROXINE SODIUM 137 UG/1
150 TABLET ORAL DAILY
Qty: 90 TABLET | Refills: 1 | OUTPATIENT
Start: 2022-03-09

## 2022-06-01 ENCOUNTER — OFFICE VISIT (OUTPATIENT)
Dept: ENDOCRINOLOGY | Age: 62
End: 2022-06-01
Payer: COMMERCIAL

## 2022-06-01 VITALS
HEIGHT: 65 IN | BODY MASS INDEX: 38.15 KG/M2 | WEIGHT: 229 LBS | OXYGEN SATURATION: 98 % | DIASTOLIC BLOOD PRESSURE: 93 MMHG | SYSTOLIC BLOOD PRESSURE: 147 MMHG | HEART RATE: 80 BPM

## 2022-06-01 DIAGNOSIS — E55.9 VITAMIN D DEFICIENCY: ICD-10-CM

## 2022-06-01 DIAGNOSIS — E89.0 POST-SURGICAL HYPOTHYROIDISM: ICD-10-CM

## 2022-06-01 DIAGNOSIS — E89.0 POST-SURGICAL HYPOTHYROIDISM: Primary | ICD-10-CM

## 2022-06-01 LAB
A/G RATIO: 1.7 (ref 1.1–2.2)
ALBUMIN SERPL-MCNC: 4.3 G/DL (ref 3.4–5)
ALP BLD-CCNC: 89 U/L (ref 40–129)
ALT SERPL-CCNC: 14 U/L (ref 10–40)
ANION GAP SERPL CALCULATED.3IONS-SCNC: 11 MMOL/L (ref 3–16)
AST SERPL-CCNC: 12 U/L (ref 15–37)
BILIRUB SERPL-MCNC: 0.3 MG/DL (ref 0–1)
BUN BLDV-MCNC: 10 MG/DL (ref 7–20)
CALCIUM SERPL-MCNC: 9.3 MG/DL (ref 8.3–10.6)
CHLORIDE BLD-SCNC: 106 MMOL/L (ref 99–110)
CO2: 23 MMOL/L (ref 21–32)
CREAT SERPL-MCNC: 0.7 MG/DL (ref 0.6–1.2)
GFR AFRICAN AMERICAN: >60
GFR NON-AFRICAN AMERICAN: >60
GLUCOSE BLD-MCNC: 108 MG/DL (ref 70–99)
PARATHYROID HORMONE INTACT: 44.8 PG/ML (ref 14–72)
POTASSIUM SERPL-SCNC: 4.4 MMOL/L (ref 3.5–5.1)
SODIUM BLD-SCNC: 140 MMOL/L (ref 136–145)
T4 FREE: 2.2 NG/DL (ref 0.9–1.8)
TOTAL PROTEIN: 6.8 G/DL (ref 6.4–8.2)
TSH SERPL DL<=0.05 MIU/L-ACNC: <0.01 UIU/ML (ref 0.27–4.2)
VITAMIN D 25-HYDROXY: 35.4 NG/ML

## 2022-06-01 PROCEDURE — 99214 OFFICE O/P EST MOD 30 MIN: CPT | Performed by: INTERNAL MEDICINE

## 2022-06-01 RX ORDER — LEVOTHYROXINE SODIUM 112 UG/1
112 TABLET ORAL DAILY
Qty: 90 TABLET | Refills: 1 | Status: SHIPPED | OUTPATIENT
Start: 2022-06-01

## 2022-06-01 RX ORDER — MONTELUKAST SODIUM 10 MG/1
TABLET ORAL
COMMUNITY
Start: 2022-04-24

## 2022-06-01 NOTE — PROGRESS NOTES
Patient ID: Terri Morris is a 64 y.o. female    Chief Complaint: multinodular goiter s/p thyroidectomy, postsurgical hypothyroidism     HPI:   Terri Morris is here for an evaluation of multinodular goiter s/p thyroidectomy, postsurgical hypothyroidism     Her apple watch showed heart rate >200 in reading book, resting. In ER /100's. TSH 0.05, FT4 4.97 - Aug 2021     CT neck - Oct 2020  Multinodular goiter. On the left, dominant nodule measures 2.6 x 3.0 cm mid to lower pole Upper pole nodule measures 17 x 14 mm  Right thyroid nodule measures 17 x 19 x 27 mm. Left lobe is larger than the right with mild substernal extension and tracheal deviation to the right. Repeat TFTs normal - Sep 2021     Saw Dr. Heber Lima for goiter causing tracheal deviation   Toxic Multinodular Goiter: s/p total thyroidectomy on 11/3/21. Pathology showed nodular hyperplasia with expanded colloid follicles and adenomatoid nodules, no evidence of malignancy. Post operative PTH 39, Ca 8.9. Menopause around 62     Family history of thyroid disorder: sister has thyroidectomy (no cancer). Mother had thyroid disease. No neck radiation  No Recent iodine loading in form of contrast material for diagnostic studies/cardiac cath    Currently on     Levothyroxine 137 mcg daily in am with water and waits for 60 minutes. Energy levels are good    Weight is mary by 4 lbs    Skin is dry but not peeling any more    Denies heat or cold intolerance. She feels hot at night when  turns up the heat. No palpitations    Denies depression or anxiety ,remors, sleep issues   No neck pain, she has compressive neck symptoms for 2-3 years     Off Biotin 5000 mcg daily since surgery.  No other food supplements, herbs or medications including Biotin  No Recent URTI      The following portions of the patient's history were reviewed and updated as appropriate:       Family History   Problem Relation Age of Onset    Diabetes Maternal Aunt  Diabetes Maternal Uncle     Diabetes Maternal Grandmother     Stroke Maternal Grandmother     Diabetes Maternal Grandfather     Heart Disease Maternal Grandfather     High Blood Pressure Maternal Grandfather     Diabetes Maternal Cousin     Cancer Neg Hx             Social History     Socioeconomic History    Marital status:      Spouse name: Not on file    Number of children: Not on file    Years of education: Not on file    Highest education level: Not on file   Occupational History    Not on file   Tobacco Use    Smoking status: Never Smoker    Smokeless tobacco: Never Used   Vaping Use    Vaping Use: Never used   Substance and Sexual Activity    Alcohol use: Yes     Alcohol/week: 2.0 standard drinks     Types: 2 Glasses of wine per week    Drug use: No    Sexual activity: Yes     Partners: Male   Other Topics Concern    Not on file   Social History Narrative    Not on file     Social Determinants of Health     Financial Resource Strain: Low Risk     Difficulty of Paying Living Expenses: Not hard at all   Food Insecurity: No Food Insecurity    Worried About [address] [employer] [address] in the Last Year: Never true    [facility] in the Last Year: Never true   Transportation Needs:     Lack of Transportation (Medical): Not on file    Lack of Transportation (Non-Medical):  Not on file   Physical Activity:     Days of Exercise per Week: Not on file    Minutes of Exercise per Session: Not on file   Stress:     Feeling of Stress : Not on file   Social Connections:     Frequency of Communication with Friends and Family: Not on file    Frequency of Social Gatherings with Friends and Family: Not on file    Attends Restorationist Services: Not on file    Active Member of Clubs or Organizations: Not on file    Attends Club or Organization Meetings: Not on file    Marital Status: Not on file   Intimate Partner Violence:     Fear of Current or Ex-Partner: Not on file    Emotionally Abused: Not on file    Physically Abused: Not on file    Sexually Abused: Not on file   Housing Stability:     Unable to Pay for Housing in the Last Year: Not on file    Number of Places Lived in the Last Year: Not on file    Unstable Housing in the Last Year: Not on file           Past Medical History:   Diagnosis Date    Allergic rhinitis     Asthma 10/20/2021    Dizziness     GERD (gastroesophageal reflux disease)     Goiter     Osteoarthritis     Right ankle effusion     2014    Sinusitis     Sleep apnea     Tinnitus     TMJ dysfunction     Visual impairment     wears glasses         Past Surgical History:   Procedure Laterality Date    ANKLE FUSION Right 2014    Omar PLUMMER    OVARY SURGERY  2016    polyps - Jonathan Álvarez MD    THYROIDECTOMY, COMPLETION           No Known Allergies        Current Outpatient Medications:     montelukast (SINGULAIR) 10 MG tablet, , Disp: , Rfl:     levothyroxine (SYNTHROID) 137 MCG tablet, Take 1 tablet by mouth Daily, Disp: 90 tablet, Rfl: 1    Triamcinolone Acetonide (NASACORT AQ NA), by Nasal route, Disp: , Rfl:     cetirizine (ZYRTEC ALLERGY) 10 MG tablet, Take 10 mg by mouth daily, Disp: , Rfl:     Cyanocobalamin (VITAMIN B-12) 5000 MCG TBDP, Take by mouth daily, Disp: , Rfl:     ALBUTEROL IN, Inhale into the lungs, Disp: , Rfl:     vitamin D 1000 UNITS CAPS, Take 5,000 Int'l Units by mouth daily , Disp: , Rfl:       Review of Systems:  Constitutional: Negative for fever, chills. HENT: Negative for congestion, ear pain, rhinorrhea,  sore throat and trouble swallowing. Eyes: Negative for photophobia, redness, itching. Respiratory: Negative for cough, shortness of breath and sputum. Cardiovascular: Negative for chest pain and leg swelling. Gastrointestinal: Negative for nausea, vomiting, abdominal pain, diarrhea, constipation. Endocrine: Negative for polydipsia, polyphagia and polyuria.    Genitourinary: Negative for dysuria, urgency, frequency, hematuria and flank pain. Musculoskeletal: has lower back pain. Negative for myalgias,  arthralgias. Skin/Nail: Negative for rash, itching. Normal nails. Neurological: Negative for seizures, light-headedness, numbness and headaches. Hematological/ Lymph nodes: Negative for adenopathy. Does not bruise/bleed easily. Psychiatric/Behavioral: Negative for suicidal ideas and decreased concentration. Physical Exam:  BP (!) 147/96 (Site: Right Upper Arm, Position: Sitting, Cuff Size: Large Adult)   Pulse 80   Ht 5' 5\" (1.651 m)   Wt 229 lb (103.9 kg)   LMP 09/21/2016   SpO2 98%   BMI 38.11 kg/m²   Constitutional: Patient is oriented to person, place, and time. Patient appears well-developed and well-nourished. HENT:    Head: Normocephalic and atraumatic. Eyes: Conjunctivae and EOM are normal.    Neck: Normal range of motion. Thyroid absent, scar present in the crease. Cardiovascular: Normal rate, regular rhythm and normal heart sounds. Pulmonary/Chest: Effort normal and breath sounds normal.   Musculoskeletal: Normal range of motion. Neurological: Patient is alert and oriented to person, place, and time. Patient has normal reflexes. No hand tremors. Skin: Skin is warm and dry. Psychiatric: Patient has a normal mood and affect.  Patient behavior is normal.     Lab Review:    Orders Only on 11/30/2021   Component Date Value Ref Range Status    TSH 11/30/2021 0.03* 0.27 - 4.20 uIU/mL Final    T4 Free 11/30/2021 2.0* 0.9 - 1.8 ng/dL Final   Orders Only on 10/20/2021   Component Date Value Ref Range Status    WBC 10/20/2021 5.6  3.8 - 10.8 10E3/uL Final    RBC 10/20/2021 4.48  3.80 - 5.10 10E6/uL Final    Hemoglobin 10/20/2021 13.9  11.7 - 15.5 g/dL Final    Hematocrit 10/20/2021 41.7  35.0 - 45.0 % Final    MCV 10/20/2021 93.0  80.0 - 100.0 fL Final    MCH 10/20/2021 31.0  27.0 - 33.0 pg Final    MCHC 10/20/2021 33.3  32.0 - 36.0 g/dL Final    RDW 10/20/2021 13.9  11.0 - 15.0 % Final  Platelets 12/07/6463 272  140 - 400 10E3/uL Final    MPV 10/20/2021 9.3  7.5 - 11.5 fL Final    Sodium 10/20/2021 139  133 - 146 mmol/L Final    Potassium 10/20/2021 3.8  3.5 - 5.3 mmol/L Final    Chloride 10/20/2021 104  98 - 110 mmol/L Final    CO2 10/20/2021 27  21 - 33 mmol/L Final    Anion Gap 10/20/2021 8  3 - 16 mmol/L Final    BUN 10/20/2021 11  7 - 25 mg/dL Final    CREATININE 10/20/2021 0.70  0.60 - 1.30 mg/dL Final    Glucose 10/20/2021 105* 70 - 100 mg/dL Final    Calcium 10/20/2021 10.0  8.6 - 10.3 mg/dL Final    Phosphorus 10/20/2021 3.1  2.1 - 4.7 mg/dL Final    Albumin 10/20/2021 4.1  3.5 - 5.7 g/dL Final    Osmolality Calc 10/20/2021 288  278 - 305 mOsm/kg Final    GFR, Estimated 10/20/2021 >90  See note. Final    GFR, Estimated 10/20/2021 >90  See note. Final    PTH 10/20/2021 38.0  12.0 - 88.0 pg/mL Final    Antibody Screen 10/20/2021 Negative   Final    ABO Grouping 10/20/2021 B   Final    Rh Factor 10/20/2021 Positive   Final    Vit D, 25-Hydroxy 10/20/2021 32.7  30.0 - 100.0 ng/mL Final   Orders Only on 09/03/2021   Component Date Value Ref Range Status    Vit D, 25-Hydroxy 09/03/2021 58.3  >=30 ng/mL Final    TSH 09/03/2021 0.56  0.27 - 4.20 uIU/mL Final    T4 Free 09/03/2021 0.9  0.9 - 1.8 ng/dL Final    T3, Free 09/03/2021 2.7  2.3 - 4.2 pg/mL Final    Tsh Receptor Ab 09/03/2021 <0.90  <=1.75 IU/L Final   Orders Only on 07/23/2021   Component Date Value Ref Range Status    GI Bacterial Pathogens By PCR 07/23/2021    Final                    Value:No Shigella spp/EIEC DNA detected  No Shiga toxin-producing gene(s) detected  No Campylobacter spp. (jejuni and coli)DNA detected  No Salmonella spp.  DNA detected  Normal Range:  None detected     Orders Only on 07/19/2021   Component Date Value Ref Range Status    WBC 07/19/2021 5.7  4.0 - 11.0 K/uL Final    RBC 07/19/2021 4.18  4.00 - 5.20 M/uL Final    Hemoglobin 07/19/2021 13.4  12.0 - 16.0 g/dL Final    Hematocrit 07/19/2021 39.9  36.0 - 48.0 % Final    MCV 07/19/2021 95.4  80.0 - 100.0 fL Final    MCH 07/19/2021 31.9  26.0 - 34.0 pg Final    MCHC 07/19/2021 33.5  31.0 - 36.0 g/dL Final    RDW 07/19/2021 13.2  12.4 - 15.4 % Final    Platelets 32/24/6427 284  135 - 450 K/uL Final    MPV 07/19/2021 9.3  5.0 - 10.5 fL Final    Neutrophils % 07/19/2021 58.1  % Final    Lymphocytes % 07/19/2021 29.3  % Final    Monocytes % 07/19/2021 7.6  % Final    Eosinophils % 07/19/2021 3.8  % Final    Basophils % 07/19/2021 1.2  % Final    Neutrophils Absolute 07/19/2021 3.3  1.7 - 7.7 K/uL Final    Lymphocytes Absolute 07/19/2021 1.7  1.0 - 5.1 K/uL Final    Monocytes Absolute 07/19/2021 0.4  0.0 - 1.3 K/uL Final    Eosinophils Absolute 07/19/2021 0.2  0.0 - 0.6 K/uL Final    Basophils Absolute 07/19/2021 0.1  0.0 - 0.2 K/uL Final    Sodium 07/19/2021 141  136 - 145 mmol/L Final    Potassium 07/19/2021 4.6  3.5 - 5.1 mmol/L Final    Chloride 07/19/2021 108  99 - 110 mmol/L Final    CO2 07/19/2021 22  21 - 32 mmol/L Final    Anion Gap 07/19/2021 11  3 - 16 Final    Glucose 07/19/2021 80  70 - 99 mg/dL Final    BUN 07/19/2021 12  7 - 20 mg/dL Final    CREATININE 07/19/2021 0.7  0.6 - 1.2 mg/dL Final    GFR Non- 07/19/2021 >60  >60 Final    GFR  07/19/2021 >60  >60 Final    Calcium 07/19/2021 9.0  8.3 - 10.6 mg/dL Final    Total Protein 07/19/2021 7.0  6.4 - 8.2 g/dL Final    Albumin 07/19/2021 4.2  3.4 - 5.0 g/dL Final    Albumin/Globulin Ratio 07/19/2021 1.5  1.1 - 2.2 Final    Total Bilirubin 07/19/2021 <0.2  0.0 - 1.0 mg/dL Final    Alkaline Phosphatase 07/19/2021 75  40 - 129 U/L Final    ALT 07/19/2021 11  10 - 40 U/L Final    AST 07/19/2021 18  15 - 37 U/L Final    Globulin 07/19/2021 2.8  g/dL Final        No results found. Assessment/Plan:     Katrin Dorantes was seen today for hypothyroidism.     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 Instructed to take daily with water on an empty stomach without concomitant vitamins or calcium or other medicine. Wait for 30-45 minutes before eating. If patient is confident they missed a day of pills, they can take the missed dose with their next tablet (only for levothyroxine). 2: Vit D Def   32.7 - Oct 2021   C/w Vit D 3 5,000 units daily . Takes it some days of the week in summer. She drinks coffee outside.      Labs today  She is not fasting   TFTs, vit D, CMP, PTH    RTC in 6 months    If all labs are fine, we can change follow up to once a year       Electronically signed by Yelena Esquivel MD on 6/1/2022 at 9:02 AM

## 2022-09-14 ENCOUNTER — NURSE TRIAGE (OUTPATIENT)
Dept: OTHER | Facility: CLINIC | Age: 62
End: 2022-09-14

## 2022-09-14 NOTE — TELEPHONE ENCOUNTER
Received call from Carolann Mata at [facility] with [employer] Complaint. Subjective: Caller states \"my blood pressure has been running higher again and I wanted to know if I should started my medication again that I was taken off of since my thyroid surgery. \"     Current Symptoms: HTN- 140/99 (yesterday), SEXTON    Unable to check today. Onset: 3 days ago; unchanged      Pain Severity: denies    Temperature: denies     What has been tried: NA    Recommended disposition: See in Office Today or Tomorrow    Care advice provided, patient verbalizes understanding; denies any other questions or concerns; instructed to call back for any new or worsening symptoms. Patient/Caller agrees with recommended disposition; writer provided warm transfer to Marcia Pineda (R) at [facility] for appointment scheduling     Attention Provider: Thank you for allowing me to participate in the care of your patient. The patient was connected to triage in response to information provided to the E[facility]. Please do not respond through this encounter as the response is not directed to a shared pool.       Reason for Disposition   Patient wants to be seen    Protocols used: Blood Pressure - High-ADULT-OH

## 2022-09-15 ENCOUNTER — OFFICE VISIT (OUTPATIENT)
Dept: PRIMARY CARE CLINIC | Age: 62
End: 2022-09-15
Payer: COMMERCIAL

## 2022-09-15 VITALS
TEMPERATURE: 97.1 F | DIASTOLIC BLOOD PRESSURE: 100 MMHG | WEIGHT: 233 LBS | RESPIRATION RATE: 14 BRPM | BODY MASS INDEX: 38.82 KG/M2 | HEART RATE: 91 BPM | SYSTOLIC BLOOD PRESSURE: 150 MMHG | OXYGEN SATURATION: 100 % | HEIGHT: 65 IN

## 2022-09-15 DIAGNOSIS — I10 ESSENTIAL HYPERTENSION: Primary | ICD-10-CM

## 2022-09-15 DIAGNOSIS — I10 ESSENTIAL HYPERTENSION: ICD-10-CM

## 2022-09-15 DIAGNOSIS — R51.9 NONINTRACTABLE HEADACHE, UNSPECIFIED CHRONICITY PATTERN, UNSPECIFIED HEADACHE TYPE: ICD-10-CM

## 2022-09-15 DIAGNOSIS — Z23 NEED FOR SHINGLES VACCINE: ICD-10-CM

## 2022-09-15 DIAGNOSIS — J30.9 ALLERGIC RHINITIS, UNSPECIFIED SEASONALITY, UNSPECIFIED TRIGGER: ICD-10-CM

## 2022-09-15 PROCEDURE — 99214 OFFICE O/P EST MOD 30 MIN: CPT | Performed by: INTERNAL MEDICINE

## 2022-09-15 PROCEDURE — 90750 HZV VACC RECOMBINANT IM: CPT | Performed by: INTERNAL MEDICINE

## 2022-09-15 PROCEDURE — 90471 IMMUNIZATION ADMIN: CPT | Performed by: INTERNAL MEDICINE

## 2022-09-15 RX ORDER — AZELASTINE 1 MG/ML
1 SPRAY, METERED NASAL 2 TIMES DAILY
Qty: 30 ML | Refills: 0 | Status: SHIPPED | OUTPATIENT
Start: 2022-09-15

## 2022-09-15 RX ORDER — FLUTICASONE PROPIONATE 50 MCG
2 SPRAY, SUSPENSION (ML) NASAL DAILY
Qty: 16 G | Refills: 0 | Status: SHIPPED | OUTPATIENT
Start: 2022-09-15 | End: 2022-10-12

## 2022-09-15 RX ORDER — HYDROCHLOROTHIAZIDE 25 MG/1
25 TABLET ORAL EVERY MORNING
Qty: 30 TABLET | Refills: 0 | Status: SHIPPED | OUTPATIENT
Start: 2022-09-15 | End: 2022-10-12

## 2022-09-15 RX ORDER — FLUTICASONE PROPIONATE 50 MCG
SPRAY, SUSPENSION (ML) NASAL
Qty: 48 G | OUTPATIENT
Start: 2022-09-15

## 2022-09-15 RX ORDER — HYDROCHLOROTHIAZIDE 25 MG/1
25 TABLET ORAL EVERY MORNING
Qty: 90 TABLET | OUTPATIENT
Start: 2022-09-15

## 2022-09-15 SDOH — ECONOMIC STABILITY: HOUSING INSECURITY
IN THE LAST 12 MONTHS, WAS THERE A TIME WHEN YOU DID NOT HAVE A STEADY PLACE TO SLEEP OR SLEPT IN A SHELTER (INCLUDING NOW)?: NO

## 2022-09-15 SDOH — ECONOMIC STABILITY: INCOME INSECURITY: IN THE LAST 12 MONTHS, WAS THERE A TIME WHEN YOU WERE NOT ABLE TO PAY THE MORTGAGE OR RENT ON TIME?: NO

## 2022-09-15 SDOH — HEALTH STABILITY: PHYSICAL HEALTH: ON AVERAGE, HOW MANY DAYS PER WEEK DO YOU ENGAGE IN MODERATE TO STRENUOUS EXERCISE (LIKE A BRISK WALK)?: 1 DAY

## 2022-09-15 SDOH — HEALTH STABILITY: PHYSICAL HEALTH: ON AVERAGE, HOW MANY MINUTES DO YOU ENGAGE IN EXERCISE AT THIS LEVEL?: 30 MIN

## 2022-09-15 SDOH — ECONOMIC STABILITY: FOOD INSECURITY: WITHIN THE PAST 12 MONTHS, THE FOOD YOU BOUGHT JUST DIDN'T LAST AND YOU DIDN'T HAVE MONEY TO GET MORE.: NEVER TRUE

## 2022-09-15 SDOH — ECONOMIC STABILITY: FOOD INSECURITY: WITHIN THE PAST 12 MONTHS, YOU WORRIED THAT YOUR FOOD WOULD RUN OUT BEFORE YOU GOT MONEY TO BUY MORE.: NEVER TRUE

## 2022-09-15 SDOH — ECONOMIC STABILITY: HOUSING INSECURITY: IN THE LAST 12 MONTHS, HOW MANY PLACES HAVE YOU LIVED?: 1

## 2022-09-15 ASSESSMENT — PATIENT HEALTH QUESTIONNAIRE - PHQ9
SUM OF ALL RESPONSES TO PHQ QUESTIONS 1-9: 0
2. FEELING DOWN, DEPRESSED OR HOPELESS: 0
1. LITTLE INTEREST OR PLEASURE IN DOING THINGS: 0
SUM OF ALL RESPONSES TO PHQ9 QUESTIONS 1 & 2: 0
SUM OF ALL RESPONSES TO PHQ QUESTIONS 1-9: 0

## 2022-09-15 ASSESSMENT — SOCIAL DETERMINANTS OF HEALTH (SDOH): HOW HARD IS IT FOR YOU TO PAY FOR THE VERY BASICS LIKE FOOD, HOUSING, MEDICAL CARE, AND HEATING?: NOT HARD AT ALL

## 2022-09-15 ASSESSMENT — LIFESTYLE VARIABLES
HOW MANY STANDARD DRINKS CONTAINING ALCOHOL DO YOU HAVE ON A TYPICAL DAY: 1 OR 2
HOW OFTEN DO YOU HAVE A DRINK CONTAINING ALCOHOL: MONTHLY OR LESS

## 2022-09-15 ASSESSMENT — ENCOUNTER SYMPTOMS
CHEST TIGHTNESS: 0
SHORTNESS OF BREATH: 0

## 2022-09-15 NOTE — PROGRESS NOTES
Medication Sig Dispense Refill    montelukast (SINGULAIR) 10 MG tablet       levothyroxine (SYNTHROID) 112 MCG tablet Take 1 tablet by mouth Daily 90 tablet 1    Triamcinolone Acetonide (NASACORT AQ NA) by Nasal route      cetirizine (ZYRTEC) 10 MG tablet Take 10 mg by mouth daily      Cyanocobalamin (VITAMIN B-12) 5000 MCG TBDP Take by mouth daily      ALBUTEROL IN Inhale into the lungs      vitamin D 1000 UNITS CAPS Take 5,000 Int'l Units by mouth daily            Vitals:    09/15/22 1125 09/15/22 1226   BP: (!) 158/98 (!) 150/100   Site: Right Upper Arm    Position: Sitting    Cuff Size: Medium Adult    Pulse: 91    Resp: 14    Temp: 97.1 °F (36.2 °C)    SpO2: 100%    Weight: 233 lb (105.7 kg)    Height: 5' 5\" (1.651 m)      Body mass index is 38.77 kg/m². Physical Exam  Nursing note reviewed. Constitutional:       General: She is not in acute distress. Appearance: Normal appearance. She is well-developed. HENT:      Right Ear: Tympanic membrane and ear canal normal.      Left Ear: Tympanic membrane and ear canal normal.   Eyes:      General: Lids are normal.      Extraocular Movements: Extraocular movements intact. Conjunctiva/sclera: Conjunctivae normal.      Pupils: Pupils are equal, round, and reactive to light. Neck:      Thyroid: No thyromegaly. Vascular: No carotid bruit. Cardiovascular:      Rate and Rhythm: Normal rate and regular rhythm. Heart sounds: Normal heart sounds, S1 normal and S2 normal. No murmur heard. No friction rub. No gallop. Pulmonary:      Effort: Pulmonary effort is normal. No respiratory distress. Breath sounds: Normal breath sounds. No wheezing, rhonchi or rales. Abdominal:      General: Bowel sounds are normal. There is no distension. Palpations: Abdomen is soft. Tenderness: There is no abdominal tenderness. Musculoskeletal:      Cervical back: Neck supple. Right lower leg: No edema. Left lower leg: No edema. Lymphadenopathy:      Head:      Right side of head: No submandibular adenopathy. Left side of head: No submandibular adenopathy. Neurological:      Mental Status: She is alert and oriented to person, place, and time. Psychiatric:         Mood and Affect: Mood normal.         No results found for this visit on 09/15/22. Lab Review   Orders Only on 06/01/2022   Component Date Value    T4 Free 06/01/2022 2.2 (A)     Vit D, 25-Hydroxy 06/01/2022 35.4     PTH 06/01/2022 44.8     TSH 06/01/2022 <0.01 (A)     Sodium 06/01/2022 140     Potassium 06/01/2022 4.4     Chloride 06/01/2022 106     CO2 06/01/2022 23     Anion Gap 06/01/2022 11     Glucose 06/01/2022 108 (A)     BUN 06/01/2022 10     Creatinine 06/01/2022 0.7     GFR Non- 06/01/2022 >60     GFR  06/01/2022 >60     Calcium 06/01/2022 9.3     Total Protein 06/01/2022 6.8     Albumin 06/01/2022 4.3     Albumin/Globulin Ratio 06/01/2022 1.7     Total Bilirubin 06/01/2022 0.3     Alkaline Phosphatase 06/01/2022 89     ALT 06/01/2022 14     AST 06/01/2022 12 (A)          Assessment/Plan     1. Essential hypertension  -blood pressure is high  - Start hydroCHLOROthiazide (HYDRODIURIL) 25 MG tablet; Take 1 tablet by mouth every morning  Dispense: 30 tablet; Refill: 0  -Low sodium diet  -Regular aerobic exercise    2. Nonintractable headache, unspecified chronicity pattern, unspecified headache type  -likely due to high blood pressure  -Start hydroCHLOROthiazide (HYDRODIURIL) 25 MG tablet; Take 1 tablet by mouth every morning for blood pressure   -Tylenol or Advil as needed    3.  Allergic rhinitis, unspecified seasonality, unspecified trigger  - Start azelastine (ASTELIN) 0.1 % nasal spray; 1 spray by Nasal route 2 times daily Use in each nostril as directed  Dispense: 30 mL; Refill: 0  - star fluticasone (FLONASE) 50 MCG/ACT nasal spray; 2 sprays by Nasal route daily  Dispense: 16 g; Refill: 0  -Discontinue Nasacort nasal

## 2022-09-25 PROBLEM — R51.9 NONINTRACTABLE HEADACHE: Status: ACTIVE | Noted: 2022-09-25

## 2022-09-25 ASSESSMENT — ENCOUNTER SYMPTOMS
VOMITING: 0
SORE THROAT: 0
NAUSEA: 0
SINUS PRESSURE: 0
WHEEZING: 0
CONSTIPATION: 0
DIARRHEA: 0
RHINORRHEA: 1
ABDOMINAL PAIN: 0
COUGH: 0
SINUS PAIN: 0
BLOOD IN STOOL: 0

## 2022-10-12 DIAGNOSIS — J30.9 ALLERGIC RHINITIS, UNSPECIFIED SEASONALITY, UNSPECIFIED TRIGGER: ICD-10-CM

## 2022-10-12 DIAGNOSIS — I10 ESSENTIAL HYPERTENSION: ICD-10-CM

## 2022-10-12 RX ORDER — FLUTICASONE PROPIONATE 50 MCG
SPRAY, SUSPENSION (ML) NASAL
Qty: 48 G | Refills: 0 | Status: SHIPPED | OUTPATIENT
Start: 2022-10-12

## 2022-10-12 RX ORDER — HYDROCHLOROTHIAZIDE 25 MG/1
25 TABLET ORAL EVERY MORNING
Qty: 30 TABLET | Refills: 0 | Status: SHIPPED | OUTPATIENT
Start: 2022-10-12 | End: 2022-10-14

## 2022-10-12 NOTE — TELEPHONE ENCOUNTER
Medication:   Requested Prescriptions     Pending Prescriptions Disp Refills    hydroCHLOROthiazide (HYDRODIURIL) 25 MG tablet [Pharmacy Med Name: HYDROCHLOROTHIAZIDE 25MG TABLETS] 30 tablet 0     Sig: TAKE 1 TABLET BY MOUTH EVERY MORNING    fluticasone (FLONASE) 50 MCG/ACT nasal spray [Pharmacy Med Name: FLUTICASONE 50MCG NASAL SP (120) RX] 48 g      Sig: SHAKE LIQUID AND USE 2 SPRAYS IN EACH NOSTRIL DAILY     Last Filled:  9.15.22    Last appt: 9/15/2022   Next appt: 10/14/2022    Last OARRS: No flowsheet data found.

## 2022-10-13 NOTE — TELEPHONE ENCOUNTER
Medication:   Requested Prescriptions     Pending Prescriptions Disp Refills    hydroCHLOROthiazide (HYDRODIURIL) 25 MG tablet [Pharmacy Med Name: HYDROCHLOROTHIAZIDE 25MG TABLETS] 90 tablet      Sig: TAKE 1 TABLET BY MOUTH EVERY MORNING     Last Filled:  10.12.22    Last appt: 9/15/2022   Next appt: 10/14/2022    Last OARRS: No flowsheet data found.

## 2022-10-14 ENCOUNTER — OFFICE VISIT (OUTPATIENT)
Dept: PRIMARY CARE CLINIC | Age: 62
End: 2022-10-14

## 2022-10-14 VITALS
HEART RATE: 96 BPM | HEIGHT: 65 IN | TEMPERATURE: 97.2 F | WEIGHT: 231 LBS | RESPIRATION RATE: 14 BRPM | SYSTOLIC BLOOD PRESSURE: 120 MMHG | BODY MASS INDEX: 38.49 KG/M2 | DIASTOLIC BLOOD PRESSURE: 70 MMHG | OXYGEN SATURATION: 98 %

## 2022-10-14 DIAGNOSIS — J30.9 ALLERGIC RHINITIS, UNSPECIFIED SEASONALITY, UNSPECIFIED TRIGGER: ICD-10-CM

## 2022-10-14 DIAGNOSIS — I10 ESSENTIAL HYPERTENSION: ICD-10-CM

## 2022-10-14 DIAGNOSIS — Z00.00 ENCOUNTER FOR WELL ADULT EXAM WITHOUT ABNORMAL FINDINGS: Primary | ICD-10-CM

## 2022-10-14 DIAGNOSIS — Z00.00 ENCOUNTER FOR WELL ADULT EXAM WITHOUT ABNORMAL FINDINGS: ICD-10-CM

## 2022-10-14 DIAGNOSIS — Z12.31 ENCOUNTER FOR SCREENING MAMMOGRAM FOR BREAST CANCER: ICD-10-CM

## 2022-10-14 LAB
A/G RATIO: 2 (ref 1.1–2.2)
ALBUMIN SERPL-MCNC: 4.6 G/DL (ref 3.4–5)
ALP BLD-CCNC: 91 U/L (ref 40–129)
ALT SERPL-CCNC: 15 U/L (ref 10–40)
ANION GAP SERPL CALCULATED.3IONS-SCNC: 11 MMOL/L (ref 3–16)
AST SERPL-CCNC: 15 U/L (ref 15–37)
BASOPHILS ABSOLUTE: 0.1 K/UL (ref 0–0.2)
BASOPHILS RELATIVE PERCENT: 1.6 %
BILIRUB SERPL-MCNC: 0.4 MG/DL (ref 0–1)
BILIRUBIN URINE: NEGATIVE
BLOOD, URINE: NEGATIVE
BUN BLDV-MCNC: 12 MG/DL (ref 7–20)
CALCIUM SERPL-MCNC: 9.8 MG/DL (ref 8.3–10.6)
CHLORIDE BLD-SCNC: 103 MMOL/L (ref 99–110)
CHOLESTEROL, TOTAL: 223 MG/DL (ref 0–199)
CLARITY: CLEAR
CO2: 30 MMOL/L (ref 21–32)
COLOR: YELLOW
CREAT SERPL-MCNC: 0.8 MG/DL (ref 0.6–1.2)
EOSINOPHILS ABSOLUTE: 0.2 K/UL (ref 0–0.6)
EOSINOPHILS RELATIVE PERCENT: 3 %
GFR AFRICAN AMERICAN: >60
GFR NON-AFRICAN AMERICAN: >60
GLUCOSE BLD-MCNC: 102 MG/DL (ref 70–99)
GLUCOSE URINE: NEGATIVE MG/DL
HCT VFR BLD CALC: 42.4 % (ref 36–48)
HDLC SERPL-MCNC: 76 MG/DL (ref 40–60)
HEMOGLOBIN: 13.9 G/DL (ref 12–16)
KETONES, URINE: NEGATIVE MG/DL
LDL CHOLESTEROL CALCULATED: 131 MG/DL
LEUKOCYTE ESTERASE, URINE: NEGATIVE
LYMPHOCYTES ABSOLUTE: 1.9 K/UL (ref 1–5.1)
LYMPHOCYTES RELATIVE PERCENT: 35.1 %
MCH RBC QN AUTO: 31 PG (ref 26–34)
MCHC RBC AUTO-ENTMCNC: 32.8 G/DL (ref 31–36)
MCV RBC AUTO: 94.6 FL (ref 80–100)
MICROSCOPIC EXAMINATION: NORMAL
MONOCYTES ABSOLUTE: 0.4 K/UL (ref 0–1.3)
MONOCYTES RELATIVE PERCENT: 7.1 %
NEUTROPHILS ABSOLUTE: 2.9 K/UL (ref 1.7–7.7)
NEUTROPHILS RELATIVE PERCENT: 53.2 %
NITRITE, URINE: NEGATIVE
PDW BLD-RTO: 13.6 % (ref 12.4–15.4)
PH UA: 6 (ref 5–8)
PLATELET # BLD: 297 K/UL (ref 135–450)
PMV BLD AUTO: 8.7 FL (ref 5–10.5)
POTASSIUM SERPL-SCNC: 4.1 MMOL/L (ref 3.5–5.1)
PROTEIN UA: NEGATIVE MG/DL
RBC # BLD: 4.48 M/UL (ref 4–5.2)
SODIUM BLD-SCNC: 144 MMOL/L (ref 136–145)
SPECIFIC GRAVITY UA: 1.02 (ref 1–1.03)
TOTAL PROTEIN: 6.9 G/DL (ref 6.4–8.2)
TRIGL SERPL-MCNC: 81 MG/DL (ref 0–150)
URINE REFLEX TO CULTURE: NORMAL
URINE TYPE: NORMAL
UROBILINOGEN, URINE: 0.2 E.U./DL
VLDLC SERPL CALC-MCNC: 16 MG/DL
WBC # BLD: 5.5 K/UL (ref 4–11)

## 2022-10-14 RX ORDER — HYDROCHLOROTHIAZIDE 25 MG/1
25 TABLET ORAL EVERY MORNING
Qty: 90 TABLET | Refills: 1 | Status: SHIPPED | OUTPATIENT
Start: 2022-10-14

## 2022-10-14 NOTE — PROGRESS NOTES
Well Adult Note  Name: Jessica Parra Date: 10/14/2022   MRN: [id number] Sex: Female   Age: 58 y.o. Ethnicity: Non- / Non    : 1960 Race: Black /         Chief Complaint   Patient presents with    Annual Exam       Ericka Peraza is here for well adult exam.  History:  Patient presents for annual physical exam. Patient will schedule pap smear with Gynecology. Mammogram done on 21. Patient has hypertension. Patient takes HCTZ 25mg once daily and states it is working good. Patient states her BP is higher in the morning and goes down. Patient states headaches better. Patient states she is trying to do the DASH diet. Patient states she increases fruits and vegetables and lowers sodium. Patient walks 3 miles 3-4 days per week. Patient has allergic rhinitis. Patient takes Astelin nasal spray, Zyrtec 10mg once daily, and Singulair nasal spray. Patient states she has mild allergy drainage. Patient states medications helping. Patient states her Flu shot done at work      Review of Systems   Constitutional:  Negative for activity change, appetite change, chills, diaphoresis, fatigue, fever and unexpected weight change. HENT:  Negative for congestion, ear discharge, ear pain, facial swelling, hearing loss, mouth sores, nosebleeds, postnasal drip, rhinorrhea, sinus pressure, sinus pain, sneezing, sore throat, tinnitus, trouble swallowing and voice change. Eyes:  Negative for photophobia, pain, discharge, redness, itching and visual disturbance. Respiratory:  Negative for apnea, cough, choking, chest tightness, shortness of breath and wheezing. Cardiovascular:  Negative for chest pain, palpitations and leg swelling. Gastrointestinal:  Negative for abdominal distention, abdominal pain, anal bleeding, blood in stool, constipation, diarrhea, nausea, rectal pain and vomiting. Endocrine: Negative for cold intolerance and heat intolerance.    Genitourinary: Negative for decreased urine volume, difficulty urinating, dysuria, flank pain, frequency, genital sores, hematuria, menstrual problem, pelvic pain, urgency, vaginal bleeding, vaginal discharge and vaginal pain. Musculoskeletal:  Negative for arthralgias, back pain, gait problem, joint swelling, myalgias, neck pain and neck stiffness. Skin:  Negative for rash and wound. Neurological:  Negative for dizziness, tremors, seizures, syncope, facial asymmetry, speech difficulty, weakness, light-headedness, numbness and headaches. Hematological:  Negative for adenopathy. Does not bruise/bleed easily. Psychiatric/Behavioral:  Negative for decreased concentration, dysphoric mood and sleep disturbance. The patient is not nervous/anxious. All other systems reviewed and are negative. No Known Allergies      Prior to Visit Medications    Medication Sig Taking?  Authorizing Provider   azelastine (ASTELIN) 0.1 % nasal spray 1 spray by Nasal route 2 times daily Use in each nostril as directed Yes Fabiola Madden MD   montelukast (SINGULAIR) 10 MG tablet  Yes Historical Provider, MD   levothyroxine (SYNTHROID) 112 MCG tablet Take 1 tablet by mouth Daily Yes Renee Wilhelm MD   cetirizine (ZYRTEC) 10 MG tablet Take 10 mg by mouth daily Yes Historical Provider, MD   Cyanocobalamin (VITAMIN B-12) 5000 MCG TBDP Take by mouth daily Yes Historical Provider, MD   ALBUTEROL IN Inhale into the lungs Yes Historical Provider, MD   vitamin D 1000 UNITS CAPS Take 5,000 Int'l Units by mouth daily  Yes Historical Provider, MD   hydroCHLOROthiazide (HYDRODIURIL) 25 MG tablet TAKE 1 TABLET BY MOUTH EVERY MORNING  Fabiola Madden MD   fluticasone (FLONASE) 50 MCG/ACT nasal spray SHAKE LIQUID AND USE 2 SPRAYS IN EACH NOSTRIL DAILY  Patient not taking: Reported on 10/14/2022  Fabiola Madden MD   Triamcinolone Acetonide (NASACORT AQ NA) by Nasal route  Patient not taking: Reported on 10/14/2022  Historical Provider, MD Past Medical History:   Diagnosis Date    Allergic rhinitis     Asthma 10/20/2021    Dizziness     Essential hypertension 9/15/2022    GERD (gastroesophageal reflux disease)     Goiter     Nonintractable headache 9/25/2022    Osteoarthritis     Right ankle effusion     2014    Sinusitis     Sleep apnea     Tinnitus     TMJ dysfunction     Visual impairment     wears glasses       Past Surgical History:   Procedure Laterality Date    ANKLE FUSION Right 2014    Kiehl DPM    OVARY SURGERY  2016    polyps - Lawerence Anay GANDHI    THYROIDECTOMY, COMPLETION           Family History   Problem Relation Age of Onset    Diabetes Maternal Aunt     Diabetes Maternal Uncle     Diabetes Maternal Grandmother     Stroke Maternal Grandmother     Diabetes Maternal Grandfather     Heart Disease Maternal Grandfather     High Blood Pressure Maternal Grandfather     Diabetes Maternal Cousin     Cancer Neg Hx        Social History     Tobacco Use    Smoking status: Never    Smokeless tobacco: Never   Vaping Use    Vaping Use: Never used   Substance Use Topics    Alcohol use: Yes     Alcohol/week: 2.0 standard drinks     Types: 2 Glasses of wine per week    Drug use: No       Objective   /70 (Site: Right Upper Arm, Position: Sitting, Cuff Size: Medium Adult)   Pulse 96   Temp 97.2 °F (36.2 °C) (Temporal)   Resp 14   Ht 5' 4.57\" (1.64 m)   Wt 231 lb (104.8 kg)   LMP 09/21/2016   SpO2 98%   BMI 38.96 kg/m²   Wt Readings from Last 3 Encounters:   10/14/22 231 lb (104.8 kg)   09/15/22 233 lb (105.7 kg)   06/01/22 229 lb (103.9 kg)     There were no vitals filed for this visit. Physical Exam  Constitutional:       General: She is not in acute distress. Appearance: Normal appearance. HENT:      Head: Normocephalic and atraumatic.       Right Ear: Tympanic membrane, ear canal and external ear normal.      Left Ear: Tympanic membrane, ear canal and external ear normal.      Nose: Nose normal.   Eyes:      General: Lids are normal.      Extraocular Movements: Extraocular movements intact. Conjunctiva/sclera: Conjunctivae normal.      Pupils: Pupils are equal, round, and reactive to light. Neck:      Thyroid: Thyromegaly present. Vascular: No carotid bruit. Cardiovascular:      Rate and Rhythm: Normal rate and regular rhythm. Heart sounds: Normal heart sounds, S1 normal and S2 normal. No murmur heard. Pulmonary:      Effort: Pulmonary effort is normal.      Breath sounds: Normal breath sounds. Abdominal:      General: Bowel sounds are normal.      Palpations: Abdomen is soft. There is no hepatomegaly or splenomegaly. Tenderness: There is no abdominal tenderness. Musculoskeletal:      Right shoulder: No tenderness. Normal range of motion. Left shoulder: No tenderness. Normal range of motion. Right elbow: No tenderness. Left elbow: No tenderness. Right wrist: No swelling or tenderness. Left wrist: No swelling or tenderness. Right hand: No swelling or tenderness. Left hand: No swelling or tenderness. Cervical back: Neck supple. No tenderness. Thoracic back: No tenderness. Lumbar back: No tenderness. Normal range of motion. Right hip: No tenderness. Left hip: No tenderness. Right knee: No tenderness. Left knee: No tenderness. Right lower leg: No edema. Left lower leg: No edema. Right ankle: No swelling. No tenderness. Left ankle: No swelling. No tenderness. Right foot: No swelling. Left foot: No swelling. Lymphadenopathy:      Head:      Right side of head: No submandibular adenopathy. Left side of head: No submandibular adenopathy. Skin:     General: Skin is warm and dry. Neurological:      Mental Status: She is alert and oriented to person, place, and time. Gait: Gait normal.      Deep Tendon Reflexes: Reflexes are normal and symmetric.    Psychiatric:         Attention and Perception: Attention normal.         Mood and Affect: Mood normal.         Speech: Speech normal.         Assessment   Plan   1. Encounter for well adult exam without abnormal findings  - CBC with Auto Differential; Future  - Comprehensive Metabolic Panel; Future  - Hemoglobin A1C; Future  - Lipid Panel; Future  - Urinalysis with Reflex to Culture; Future  - Patient will schedule pap with Gynecology  - Mammogram done on 4/8/21 and patient given order  - Covid-19 vaccine done on 1/20/21 and 2/17/21  - Flu shot done at patient's work  - Shingrix vaccine dose 1 done on 9/15/22  -Tdap done on 11/22/16    2. Essential hypertension  -stable  -Continue HCTZ 25mg once daily  -Low sodium diet  -Regular aerobic exercise    3. Allergic rhinitis, unspecified seasonality, unspecified trigger  -stable  -Continue Astelin nasal spray  -Continue Zyrtec 10mg once daily  -Continue Singulair nasal spray    4. Encounter for screening mammogram for breast cancer  - DYANA DIGITAL SCREEN W OR WO CAD BILATERAL;  Future      Personalized Preventive Plan   Current Health Maintenance Status  Immunization History   Administered Date(s) Administered    COVID-19, MODERNA BLUE border, Primary or Immunocompromised, (age 12y+), IM, 100 mcg/0.5mL 01/20/2021, 02/17/2021    Hepatitis A Adult (Havrix, Vaqta) 06/13/2002, 06/13/2002    Hepatitis B Ped/Adol (Engerix-B, Recombivax HB) 06/13/2002, 06/13/2002, 07/17/2002, 07/17/2002    Influenza Vaccine, unspecified formulation 10/17/2017    Influenza Virus Vaccine 01/07/2013, 10/17/2017, 10/05/2018, 10/05/2018    Influenza, FLUARIX, FLULAVAL, FLUZONE (age 10 mo+) AND AFLURIA, (age 1 y+), PF, 0.5mL 12/23/2019    Influenza, FLUBLOK, (age 25 y+), PF, 0.5mL 10/05/2020    PPD Test 03/18/2019    Tdap (Boostrix, Adacel) 11/22/2016    Tetanus Toxoid, absorbed 06/13/2002    Typhoid Vi capsular polysaccharide (Typhim VI) 06/13/2002, 06/13/2002    Unknown Immunization 03/18/2019    Yellow Fever (YF-Vax) 06/13/2002, 06/13/2002    Zoster Recombinant (Shingrix) 09/15/2022        Health Maintenance   Topic Date Due    Pneumococcal 0-64 years Vaccine (1 - PCV) Never done    COVID-19 Vaccine (3 - Booster for Moderna series) 04/14/2021    Cervical cancer screen  10/02/2021    Flu vaccine (1) 08/01/2022    Shingles vaccine (2 of 2) 11/10/2022    Breast cancer screen  04/08/2023    Depression Screen  09/15/2023    A1C test (Diabetic or Prediabetic)  10/14/2023    DTaP/Tdap/Td vaccine (2 - Td or Tdap) 11/22/2026    Lipids  10/14/2027    Colorectal Cancer Screen  05/07/2031    Hepatitis C screen  Completed    HIV screen  Completed    Hepatitis A vaccine  Aged Out    Hib vaccine  Aged Out    Meningococcal (ACWY) vaccine  Aged Out     Recommendations for PVC Recycling Due: see orders and patient instructions/AVS.    Return in about 6 months (around 4/14/2023) for hypertension and allergic rhinitis.

## 2022-10-14 NOTE — PATIENT INSTRUCTIONS
Well Visit, Ages 25 to 72: Care Instructions  Well visits can help you stay healthy. Your doctor has checked your overall health and may have suggested ways to take good care of yourself. Your doctor also may have recommended tests. You can help prevent illness with healthy eating, good sleep, vaccinations, regular exercise, and other steps. Get the tests that you and your doctor decide on. Depending on your age and risks, examples might include screening for diabetes; hepatitis C; HIV; and cervical, breast, lung, and colon cancer. Screening helps find diseases before any symptoms appear. Eat healthy foods. Choose fruits, vegetables, whole grains, lean protein, and low-fat dairy foods. Limit saturated fat and reduce salt. Limit alcohol. Men should have no more than 2 drinks a day. Women should have no more than 1. For some people, no alcohol is the best choice. Exercise. Get at least 30 minutes of exercise on most days of the week. Walking can be a good choice. Reach and stay at your healthy weight. This will lower your risk for many health problems. Take care of your mental health. Try to stay connected with friends, family, and community, and find ways to manage stress. If you're feeling depressed or hopeless, talk to someone. A counselor can help. If you don't have a counselor, talk to your doctor. Talk to your doctor if you think you may have a problem with alcohol or drug use. This includes prescription medicines and illegal drugs. Avoid tobacco and nicotine: Don't smoke, vape, or chew. If you need help quitting, talk to your doctor. Practice safer sex. Getting tested, using condoms or dental dams, and limiting sex partners can help prevent STIs. Use birth control if it's important to you to prevent pregnancy. Talk with your doctor about your choices and what might be best for you. Prevent problems where you can.  Protect your skin from too much sun, wash your hands, brush your teeth twice a day, and wear a seat belt in the car. Where can you learn more? Go to [email or web address] org and sign in to your [email or web address] account. Enter P072 in the [facility] box to learn more about \"Well Visit, Ages 25 to 72: Care Instructions. \"     If you do not have an account, please click on the \"Sign Up Now\" link. Current as of: March 9, 2022               Content Version: 13.4  © 1704-6540 [employer], [employer]. Care instructions adapted under license by [facility] ([facility]). If you have questions about a medical condition or this instruction, always ask your healthcare professional. [address] any warranty or liability for your use of this information.

## 2022-10-15 LAB
ESTIMATED AVERAGE GLUCOSE: 125.5 MG/DL
HBA1C MFR BLD: 6 %

## 2022-10-29 ASSESSMENT — ENCOUNTER SYMPTOMS
VOICE CHANGE: 0
TROUBLE SWALLOWING: 0
VOMITING: 0
BLOOD IN STOOL: 0
EYE DISCHARGE: 0
SINUS PAIN: 0
WHEEZING: 0
EYE ITCHING: 0
CHOKING: 0
EYE REDNESS: 0
FACIAL SWELLING: 0
ABDOMINAL DISTENTION: 0
CHEST TIGHTNESS: 0
DIARRHEA: 0
APNEA: 0
RECTAL PAIN: 0
CONSTIPATION: 0
ABDOMINAL PAIN: 0
RHINORRHEA: 0
EYE PAIN: 0
SINUS PRESSURE: 0
SHORTNESS OF BREATH: 0
PHOTOPHOBIA: 0
BACK PAIN: 0
COUGH: 0
NAUSEA: 0
ANAL BLEEDING: 0
SORE THROAT: 0

## 2022-11-21 DIAGNOSIS — E89.0 POST-SURGICAL HYPOTHYROIDISM: ICD-10-CM

## 2022-11-21 DIAGNOSIS — E55.9 VITAMIN D DEFICIENCY: ICD-10-CM

## 2022-11-21 RX ORDER — LEVOTHYROXINE SODIUM 112 UG/1
112 TABLET ORAL DAILY
Qty: 90 TABLET | Refills: 1 | Status: SHIPPED | OUTPATIENT
Start: 2022-11-21 | End: 2022-12-01 | Stop reason: SDUPTHER

## 2022-11-21 NOTE — TELEPHONE ENCOUNTER
Medication:   Requested Prescriptions     Pending Prescriptions Disp Refills    levothyroxine (SYNTHROID) 112 MCG tablet [Pharmacy Med Name: LEVOTHYROXINE 0.112MG (112MCG) TABS] 90 tablet 0     Sig: TAKE 1 TABLET BY MOUTH DAILY       Last Filled:  6/1/22    Patient Phone Number: [phone] (home)     Last appt: 6/1/2022   Next appt: 12/1/2022    Last Thyroid:   Lab Results   Component Value Date/Time    TSH <0.01 06/01/2022 09:30 AM    FT3 2.7 09/03/2021 04:31 PM    T4FREE 2.2 06/01/2022 09:30 AM

## 2022-12-01 ENCOUNTER — OFFICE VISIT (OUTPATIENT)
Dept: ENDOCRINOLOGY | Age: 62
End: 2022-12-01

## 2022-12-01 VITALS
OXYGEN SATURATION: 97 % | WEIGHT: 230 LBS | BODY MASS INDEX: 38.32 KG/M2 | SYSTOLIC BLOOD PRESSURE: 134 MMHG | HEART RATE: 88 BPM | HEIGHT: 65 IN | DIASTOLIC BLOOD PRESSURE: 79 MMHG

## 2022-12-01 DIAGNOSIS — E55.9 VITAMIN D DEFICIENCY: ICD-10-CM

## 2022-12-01 DIAGNOSIS — E89.0 POST-SURGICAL HYPOTHYROIDISM: ICD-10-CM

## 2022-12-01 LAB
T4 FREE: 1.8 NG/DL (ref 0.9–1.8)
TSH SERPL DL<=0.05 MIU/L-ACNC: 0.18 UIU/ML (ref 0.27–4.2)

## 2022-12-01 RX ORDER — LEVOTHYROXINE SODIUM 0.1 MG/1
100 TABLET ORAL DAILY
Qty: 90 TABLET | Refills: 1 | Status: SHIPPED | OUTPATIENT
Start: 2022-12-01

## 2022-12-01 RX ORDER — LEVOTHYROXINE SODIUM 112 UG/1
112 TABLET ORAL DAILY
Qty: 90 TABLET | Refills: 3 | Status: SHIPPED | OUTPATIENT
Start: 2022-12-01 | End: 2022-12-01

## 2022-12-01 NOTE — PROGRESS NOTES
Patient ID: Kristina Mon is a 58 y.o. female    Chief Complaint: multinodular goiter s/p thyroidectomy, postsurgical hypothyroidism     HPI:   Kristina Mon is here for an evaluation of multinodular goiter s/p thyroidectomy, postsurgical hypothyroidism     Her apple watch showed heart rate >200 in reading book, resting. In ER /100's. TSH 0.05, FT4 4.97 - Aug 2021     CT neck - Oct 2020  Multinodular goiter. On the left, dominant nodule measures 2.6 x 3.0 cm mid to lower pole Upper pole nodule measures 17 x 14 mm  Right thyroid nodule measures 17 x 19 x 27 mm. Left lobe is larger than the right with mild substernal extension and tracheal deviation to the right. Repeat TFTs normal - Sep 2021     Saw Dr. Ludmila Hardy for goiter causing tracheal deviation   Toxic Multinodular Goiter: s/p total thyroidectomy on 11/3/21. Pathology showed nodular hyperplasia with expanded colloid follicles and adenomatoid nodules, no evidence of malignancy. Post operative PTH 39, Ca 8.9. Menopause around 62     Family history of thyroid disorder: sister has thyroidectomy (no cancer). Mother had thyroid disease. No neck radiation  No Recent iodine loading in form of contrast material for diagnostic studies/cardiac cath    Currently on     Levothyroxine 112 mcg daily in am with water and waits for 60 minutes. Energy levels are good    Weight is stable     Skin is dry but not peeling any more    Feels more warm but stable. She feels hot at night when  turns up the heat. No palpitations    Denies depression or anxiety ,tremors, sleep issues   No neck pain, she has compressive neck symptoms for 2-3 years     Taking zyrtec  Off Biotin 5000 mcg daily since surgery.  No other food supplements, herbs or medications including Biotin  No Recent URTI      The following portions of the patient's history were reviewed and updated as appropriate:       Family History   Problem Relation Age of Onset    Diabetes Maternal Aunt     Diabetes Maternal Uncle     Diabetes Maternal Grandmother     Stroke Maternal Grandmother     Diabetes Maternal Grandfather     Heart Disease Maternal Grandfather     High Blood Pressure Maternal Grandfather     Diabetes Maternal Cousin     Cancer Neg Hx               Social History     Socioeconomic History    Marital status:      Spouse name: Not on file    Number of children: Not on file    Years of education: Not on file    Highest education level: Not on file   Occupational History    Not on file   Tobacco Use    Smoking status: Never    Smokeless tobacco: Never   Vaping Use    Vaping Use: Never used   Substance and Sexual Activity    Alcohol use: Yes     Alcohol/week: 2.0 standard drinks     Types: 2 Glasses of wine per week    Drug use: No    Sexual activity: Yes     Partners: Male   Other Topics Concern    Not on file   Social History Narrative    Not on file     Social Determinants of Health     Financial Resource Strain: Low Risk     Difficulty of Paying Living Expenses: Not hard at all   Food Insecurity: No Food Insecurity    Worried About [address] [employer] in the Last Year: Never true    [facility] [employer] in the Last Year: Never true   Transportation Needs: No Transportation Needs    Lack of Transportation (Medical): No    Lack of Transportation (Non-Medical):  No   Physical Activity: Insufficiently Active    Days of Exercise per Week: 1 day    Minutes of Exercise per Session: 30 min   Stress: No Stress Concern Present    Feeling of Stress : Not at all   Social Connections: Not on file   Intimate Partner Violence: Not on file   Housing Stability: Low Risk     Unable to Pay for Housing in the Last Year: No    Number of Places Lived in the Last Year: 1    Unstable Housing in the Last Year: No             Past Medical History:   Diagnosis Date    Allergic rhinitis     Asthma 10/20/2021    Dizziness     Essential hypertension 9/15/2022    GERD (gastroesophageal reflux disease)     Goiter Nonintractable headache 9/25/2022    Osteoarthritis     Right ankle effusion     2014    Sinusitis     Sleep apnea     Tinnitus     TMJ dysfunction     Visual impairment     wears glasses         Past Surgical History:   Procedure Laterality Date    ANKLE FUSION Right 2014    Kiehl DPM    OVARY SURGERY  2016    polyps - Altamese Degree MD    THYROIDECTOMY, COMPLETION           No Known Allergies        Current Outpatient Medications:     levothyroxine (SYNTHROID) 112 MCG tablet, Take 1 tablet by mouth Daily, Disp: 90 tablet, Rfl: 3    hydroCHLOROthiazide (HYDRODIURIL) 25 MG tablet, TAKE 1 TABLET BY MOUTH EVERY MORNING, Disp: 90 tablet, Rfl: 1    fluticasone (FLONASE) 50 MCG/ACT nasal spray, SHAKE LIQUID AND USE 2 SPRAYS IN EACH NOSTRIL DAILY, Disp: 48 g, Rfl: 0    azelastine (ASTELIN) 0.1 % nasal spray, 1 spray by Nasal route 2 times daily Use in each nostril as directed, Disp: 30 mL, Rfl: 0    montelukast (SINGULAIR) 10 MG tablet, Take 10 mg by mouth nightly, Disp: , Rfl:     cetirizine (ZYRTEC) 10 MG tablet, Take 10 mg by mouth daily, Disp: , Rfl:     Cyanocobalamin (VITAMIN B-12) 5000 MCG TBDP, Take by mouth daily, Disp: , Rfl:     ALBUTEROL IN, Inhale into the lungs, Disp: , Rfl:     vitamin D 1000 UNITS CAPS, Take 5,000 Int'l Units by mouth daily , Disp: , Rfl:       Review of Systems:  Constitutional: Negative for fever, chills. HENT: Negative for congestion, ear pain, rhinorrhea,  sore throat and trouble swallowing. Eyes: Negative for photophobia, redness, itching. Respiratory: Negative for cough, shortness of breath and sputum. Cardiovascular: Negative for chest pain and leg swelling. Gastrointestinal: Negative for nausea, vomiting, abdominal pain, diarrhea, constipation. Endocrine: Negative for polydipsia, polyphagia and polyuria. Genitourinary: Negative for dysuria, urgency, frequency, hematuria and flank pain. Musculoskeletal: has lower back pain. Negative for myalgias,  arthralgias.    Skin/Nail: Negative for rash, itching. Normal nails. Neurological: Negative for seizures, light-headedness, numbness and headaches. Hematological/ Lymph nodes: Negative for adenopathy. Does not bruise/bleed easily. Psychiatric/Behavioral: Negative for suicidal ideas and decreased concentration. Physical Exam:  /79 (Site: Right Upper Arm, Position: Sitting, Cuff Size: Large Adult)   Pulse 88   Ht 5' 4.5\" (1.638 m)   Wt 230 lb (104.3 kg)   LMP 09/21/2016   SpO2 97%   BMI 38.87 kg/m²   Constitutional: Patient is oriented to person, place, and time. Patient appears well-developed and well-nourished. HENT:    Head: Normocephalic and atraumatic. Eyes: Conjunctivae and EOM are normal.    Neck: Normal range of motion. Thyroid absent, scar present in the crease. Cardiovascular: Normal rate, regular rhythm and normal heart sounds. Pulmonary/Chest: Effort normal and breath sounds normal.   Musculoskeletal: Normal range of motion. Neurological: Patient is alert and oriented to person, place, and time. Patient has slow reflexes. No hand tremors. Skin: Skin is warm and dry. Psychiatric: Patient has a normal mood and affect.  Patient behavior is normal.     Lab Review:    Orders Only on 10/14/2022   Component Date Value Ref Range Status    Color, UA 10/14/2022 Yellow  Straw/Yellow Final    Clarity, UA 10/14/2022 Clear  Clear Final    Glucose, Ur 10/14/2022 Negative  Negative mg/dL Final    Bilirubin Urine 10/14/2022 Negative  Negative Final    Ketones, Urine 10/14/2022 Negative  Negative mg/dL Final    Specific Gravity, UA 10/14/2022 1.017  1.005 - 1.030 Final    Blood, Urine 10/14/2022 Negative  Negative Final    pH, UA 10/14/2022 6.0  5.0 - 8.0 Final    Protein, UA 10/14/2022 Negative  Negative mg/dL Final    Urobilinogen, Urine 10/14/2022 0.2  <2.0 E.U./dL Final    Nitrite, Urine 10/14/2022 Negative  Negative Final    Leukocyte Esterase, Urine 10/14/2022 Negative  Negative Final    Microscopic Examination 10/14/2022 Not Indicated   Final    Urine Type 10/14/2022 Cleancatch   Final    Urine Reflex to Culture 10/14/2022 Not Indicated   Final    Cholesterol, Total 10/14/2022 223 (A)  0 - 199 mg/dL Final    Triglycerides 10/14/2022 81  0 - 150 mg/dL Final    HDL 10/14/2022 76 (A)  40 - 60 mg/dL Final    LDL Calculated 10/14/2022 131 (A)  <100 mg/dL Final    VLDL Cholesterol Calculated 10/14/2022 16  Not Established mg/dL Final    Hemoglobin A1C 10/14/2022 6.0  See comment % Final    eAG 10/14/2022 125.5  mg/dL Final    Sodium 10/14/2022 144  136 - 145 mmol/L Final    Potassium 10/14/2022 4.1  3.5 - 5.1 mmol/L Final    Chloride 10/14/2022 103  99 - 110 mmol/L Final    CO2 10/14/2022 30  21 - 32 mmol/L Final    Anion Gap 10/14/2022 11  3 - 16 Final    Glucose 10/14/2022 102 (A)  70 - 99 mg/dL Final    BUN 10/14/2022 12  7 - 20 mg/dL Final    Creatinine 10/14/2022 0.8  0.6 - 1.2 mg/dL Final    GFR Non- 10/14/2022 >60  >60 Final    GFR  10/14/2022 >60  >60 Final    Calcium 10/14/2022 9.8  8.3 - 10.6 mg/dL Final    Total Protein 10/14/2022 6.9  6.4 - 8.2 g/dL Final    Albumin 10/14/2022 4.6  3.4 - 5.0 g/dL Final    Albumin/Globulin Ratio 10/14/2022 2.0  1.1 - 2.2 Final    Total Bilirubin 10/14/2022 0.4  0.0 - 1.0 mg/dL Final    Alkaline Phosphatase 10/14/2022 91  40 - 129 U/L Final    ALT 10/14/2022 15  10 - 40 U/L Final    AST 10/14/2022 15  15 - 37 U/L Final    WBC 10/14/2022 5.5  4.0 - 11.0 K/uL Final    RBC 10/14/2022 4.48  4.00 - 5.20 M/uL Final    Hemoglobin 10/14/2022 13.9  12.0 - 16.0 g/dL Final    Hematocrit 10/14/2022 42.4  36.0 - 48.0 % Final    MCV 10/14/2022 94.6  80.0 - 100.0 fL Final    MCH 10/14/2022 31.0  26.0 - 34.0 pg Final    MCHC 10/14/2022 32.8  31.0 - 36.0 g/dL Final    RDW 10/14/2022 13.6  12.4 - 15.4 % Final    Platelets 80/36/9210 297  135 - 450 K/uL Final    MPV 10/14/2022 8.7  5.0 - 10.5 fL Final    Neutrophils % 10/14/2022 53.2  % Final    Lymphocytes % 10/14/2022 35.1  % Final    Monocytes % 10/14/2022 7.1  % Final    Eosinophils % 10/14/2022 3.0  % Final    Basophils % 10/14/2022 1.6  % Final    Neutrophils Absolute 10/14/2022 2.9  1.7 - 7.7 K/uL Final    Lymphocytes Absolute 10/14/2022 1.9  1.0 - 5.1 K/uL Final    Monocytes Absolute 10/14/2022 0.4  0.0 - 1.3 K/uL Final    Eosinophils Absolute 10/14/2022 0.2  0.0 - 0.6 K/uL Final    Basophils Absolute 10/14/2022 0.1  0.0 - 0.2 K/uL Final   Orders Only on 06/01/2022   Component Date Value Ref Range Status    T4 Free 06/01/2022 2.2 (A)  0.9 - 1.8 ng/dL Final    Vit D, 25-Hydroxy 06/01/2022 35.4  >=30 ng/mL Final    PTH 06/01/2022 44.8  14.0 - 72.0 pg/mL Final    TSH 06/01/2022 <0.01 (A)  0.27 - 4.20 uIU/mL Final    Sodium 06/01/2022 140  136 - 145 mmol/L Final    Potassium 06/01/2022 4.4  3.5 - 5.1 mmol/L Final    Chloride 06/01/2022 106  99 - 110 mmol/L Final    CO2 06/01/2022 23  21 - 32 mmol/L Final    Anion Gap 06/01/2022 11  3 - 16 Final    Glucose 06/01/2022 108 (A)  70 - 99 mg/dL Final    BUN 06/01/2022 10  7 - 20 mg/dL Final    Creatinine 06/01/2022 0.7  0.6 - 1.2 mg/dL Final    GFR Non- 06/01/2022 >60  >60 Final    GFR  06/01/2022 >60  >60 Final    Calcium 06/01/2022 9.3  8.3 - 10.6 mg/dL Final    Total Protein 06/01/2022 6.8  6.4 - 8.2 g/dL Final    Albumin 06/01/2022 4.3  3.4 - 5.0 g/dL Final    Albumin/Globulin Ratio 06/01/2022 1.7  1.1 - 2.2 Final    Total Bilirubin 06/01/2022 0.3  0.0 - 1.0 mg/dL Final    Alkaline Phosphatase 06/01/2022 89  40 - 129 U/L Final    ALT 06/01/2022 14  10 - 40 U/L Final    AST 06/01/2022 12 (A)  15 - 37 U/L Final        No results found. Assessment/Plan:     Rolando Avendano was seen today for follow-up and thyroid problem. 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 Instructed to take daily with water on an empty stomach without concomitant vitamins or calcium or other medicine. Wait for 30-45 minutes before eating. If patient is confident they missed a day of pills, they can take the missed dose with their next tablet (only for levothyroxine). 2: Vit D Def   35 - June 2022    C/w Vit D 3 5,000 units daily . Takes it some days of the week in summer. She drinks coffee outside.      Labs today   TFTs     RTC in 6 months    If all labs are fine, we can change follow up to once a year       Electronically signed by Codey Palomino MD on 12/1/2022 at 8:54 AM

## 2022-12-08 ENCOUNTER — TELEPHONE (OUTPATIENT)
Dept: PRIMARY CARE CLINIC | Age: 62
End: 2022-12-08

## 2022-12-08 NOTE — TELEPHONE ENCOUNTER
----- Message from [facility] sent at 12/8/2022  2:51 PM EST -----  Subject: Message to Provider    QUESTIONS  Information for Provider? pt would like to be scheduled for her second   shingles vaccine.   ---------------------------------------------------------------------------  --------------  Aleta Lorenzo [facility]  [phone]; OK to leave message on voicemail  ---------------------------------------------------------------------------  --------------  SCRIPT ANSWERS  Relationship to Patient?  Self

## 2022-12-14 ENCOUNTER — TELEPHONE (OUTPATIENT)
Dept: PRIMARY CARE CLINIC | Age: 62
End: 2022-12-14

## 2022-12-14 NOTE — TELEPHONE ENCOUNTER
Voicemail left to call and schedule appointment with a provider. Suggested Mucinex OTC if she doesn't want an appointment.

## 2022-12-14 NOTE — TELEPHONE ENCOUNTER
Patient called and stated that she has sinus congestion for a while. She is taking coricidin medication over the counter. She stated that the above medication is not helping to get rid of her sinus congestion. She wanted if Dr Alanis Stephens can advice something appropriate.     Pl review and advice    Thanks

## 2022-12-16 ENCOUNTER — NURSE ONLY (OUTPATIENT)
Dept: PRIMARY CARE CLINIC | Age: 62
End: 2022-12-16

## 2022-12-16 VITALS — SYSTOLIC BLOOD PRESSURE: 122 MMHG | DIASTOLIC BLOOD PRESSURE: 74 MMHG

## 2022-12-16 DIAGNOSIS — Z23 NEED FOR SHINGLES VACCINE: Primary | ICD-10-CM

## 2023-01-11 DIAGNOSIS — I10 ESSENTIAL HYPERTENSION: ICD-10-CM

## 2023-01-11 RX ORDER — HYDROCHLOROTHIAZIDE 25 MG/1
25 TABLET ORAL EVERY MORNING
Qty: 90 TABLET | Refills: 1 | Status: SHIPPED | OUTPATIENT
Start: 2023-01-11

## 2023-01-24 LAB — PAP SMEAR, EXTERNAL: NORMAL

## 2023-02-17 DIAGNOSIS — J30.9 ALLERGIC RHINITIS, UNSPECIFIED SEASONALITY, UNSPECIFIED TRIGGER: ICD-10-CM

## 2023-02-17 RX ORDER — AZELASTINE 1 MG/ML
1 SPRAY, METERED NASAL 2 TIMES DAILY
Qty: 30 ML | Refills: 2 | Status: SHIPPED | OUTPATIENT
Start: 2023-02-17 | End: 2023-04-20 | Stop reason: SDUPTHER

## 2023-02-17 RX ORDER — FLUTICASONE PROPIONATE 50 MCG
SPRAY, SUSPENSION (ML) NASAL
Qty: 48 G | Refills: 0 | Status: SHIPPED | OUTPATIENT
Start: 2023-02-17 | End: 2023-04-20 | Stop reason: SDUPTHER

## 2023-03-30 DIAGNOSIS — E89.0 POST-SURGICAL HYPOTHYROIDISM: ICD-10-CM

## 2023-03-30 RX ORDER — LEVOTHYROXINE SODIUM 0.1 MG/1
100 TABLET ORAL DAILY
Qty: 90 TABLET | Refills: 1 | OUTPATIENT
Start: 2023-03-30

## 2023-04-20 ENCOUNTER — OFFICE VISIT (OUTPATIENT)
Dept: PRIMARY CARE CLINIC | Age: 63
End: 2023-04-20

## 2023-04-20 VITALS
HEART RATE: 83 BPM | DIASTOLIC BLOOD PRESSURE: 82 MMHG | OXYGEN SATURATION: 98 % | BODY MASS INDEX: 38.36 KG/M2 | SYSTOLIC BLOOD PRESSURE: 134 MMHG | WEIGHT: 227 LBS

## 2023-04-20 DIAGNOSIS — I10 ESSENTIAL HYPERTENSION: Primary | ICD-10-CM

## 2023-04-20 DIAGNOSIS — R73.03 PREDIABETES: ICD-10-CM

## 2023-04-20 DIAGNOSIS — E78.2 MIXED HYPERLIPIDEMIA: ICD-10-CM

## 2023-04-20 DIAGNOSIS — J30.9 ALLERGIC RHINITIS, UNSPECIFIED SEASONALITY, UNSPECIFIED TRIGGER: ICD-10-CM

## 2023-04-20 LAB
CHOLEST SERPL-MCNC: 203 MG/DL (ref 0–199)
HDLC SERPL-MCNC: 80 MG/DL (ref 40–60)
LDLC SERPL CALC-MCNC: 108 MG/DL
TRIGL SERPL-MCNC: 76 MG/DL (ref 0–150)
VLDLC SERPL CALC-MCNC: 15 MG/DL

## 2023-04-20 RX ORDER — AZELASTINE 1 MG/ML
1 SPRAY, METERED NASAL 2 TIMES DAILY
Qty: 90 ML | Refills: 1 | Status: SHIPPED | OUTPATIENT
Start: 2023-04-20

## 2023-04-20 RX ORDER — FLUTICASONE PROPIONATE 50 MCG
SPRAY, SUSPENSION (ML) NASAL
Qty: 48 G | Refills: 1 | Status: SHIPPED | OUTPATIENT
Start: 2023-04-20

## 2023-04-20 RX ORDER — HYDROCHLOROTHIAZIDE 25 MG/1
25 TABLET ORAL EVERY MORNING
Qty: 90 TABLET | Refills: 1 | Status: SHIPPED | OUTPATIENT
Start: 2023-04-20

## 2023-04-20 ASSESSMENT — ENCOUNTER SYMPTOMS
BLOOD IN STOOL: 0
ABDOMINAL PAIN: 0
SHORTNESS OF BREATH: 0
WHEEZING: 0
CONSTIPATION: 0
DIARRHEA: 0
CHEST TIGHTNESS: 0
NAUSEA: 0
SORE THROAT: 0
SINUS PRESSURE: 0
SINUS PAIN: 0
RHINORRHEA: 0
COUGH: 0
VOMITING: 0

## 2023-04-20 NOTE — PROGRESS NOTES
10/14/2022 12:02 PM     10/14/2022 12:02 PM    MPV 8.7 10/14/2022 12:02 PM    NEUTOPHILPCT 53.2 10/14/2022 12:02 PM    LYMPHOPCT 35.1 10/14/2022 12:02 PM    MONOPCT 7.1 10/14/2022 12:02 PM    EOSRELPCT 3.0 10/14/2022 12:02 PM    BASOPCT 1.6 10/14/2022 12:02 PM    NEUTROABS 2.9 10/14/2022 12:02 PM    LYMPHSABS 1.9 10/14/2022 12:02 PM    MONOSABS 0.4 10/14/2022 12:02 PM    EOSABS 0.2 10/14/2022 12:02 PM    BASOSABS 0.1 10/14/2022 12:02 PM             Medications, Allergies, Social history, Medical history, and results reviewed      An electronic signature was used to authenticate this note.     Juliana Crisostomo MD

## 2023-04-21 LAB
EST. AVERAGE GLUCOSE BLD GHB EST-MCNC: 122.6 MG/DL
HBA1C MFR BLD: 5.9 %

## 2023-05-16 DIAGNOSIS — E55.9 VITAMIN D DEFICIENCY: ICD-10-CM

## 2023-05-16 DIAGNOSIS — E89.0 POST-SURGICAL HYPOTHYROIDISM: ICD-10-CM

## 2023-05-16 RX ORDER — LEVOTHYROXINE SODIUM 0.1 MG/1
100 TABLET ORAL DAILY
Qty: 90 TABLET | Refills: 1 | Status: SHIPPED | OUTPATIENT
Start: 2023-05-16

## 2023-05-16 RX ORDER — LEVOTHYROXINE SODIUM 112 UG/1
112 TABLET ORAL DAILY
Qty: 90 TABLET | Refills: 1 | Status: SHIPPED | OUTPATIENT
Start: 2023-05-16 | End: 2023-05-16 | Stop reason: SDUPTHER

## 2023-05-16 NOTE — TELEPHONE ENCOUNTER
Requested Refill:   Requested Prescriptions     Pending Prescriptions Disp Refills    levothyroxine (SYNTHROID) 112 MCG tablet [Pharmacy Med Name: LEVOTHYROXINE 0.112MG (112MCG) TABS] 90 tablet 1     Sig: TAKE 1 TABLET BY MOUTH DAILY     Last refilled: 12/1/2022 # 90 with 1 refill     Last Appt: 12/1/2022   Next Appt: 6/1/2023

## 2023-06-01 ENCOUNTER — OFFICE VISIT (OUTPATIENT)
Dept: ENDOCRINOLOGY | Age: 63
End: 2023-06-01

## 2023-06-01 VITALS
OXYGEN SATURATION: 96 % | HEART RATE: 84 BPM | DIASTOLIC BLOOD PRESSURE: 81 MMHG | HEIGHT: 65 IN | BODY MASS INDEX: 37.59 KG/M2 | SYSTOLIC BLOOD PRESSURE: 123 MMHG | WEIGHT: 225.6 LBS

## 2023-06-01 DIAGNOSIS — E89.0 POST-SURGICAL HYPOTHYROIDISM: ICD-10-CM

## 2023-06-01 DIAGNOSIS — E55.9 VITAMIN D DEFICIENCY: ICD-10-CM

## 2023-06-01 LAB
25(OH)D3 SERPL-MCNC: 32.7 NG/ML
T4 FREE SERPL-MCNC: 1.6 NG/DL (ref 0.9–1.8)
TSH SERPL DL<=0.005 MIU/L-ACNC: 1.34 UIU/ML (ref 0.27–4.2)

## 2023-06-01 RX ORDER — ALBUTEROL SULFATE 90 UG/1
AEROSOL, METERED RESPIRATORY (INHALATION)
COMMUNITY
Start: 2023-05-24

## 2023-06-01 RX ORDER — LEVOTHYROXINE SODIUM 0.1 MG/1
100 TABLET ORAL DAILY
Qty: 90 TABLET | Refills: 3 | Status: SHIPPED | OUTPATIENT
Start: 2023-06-01

## 2023-06-01 NOTE — PROGRESS NOTES
Patient ID: Jhonny Parmar is a 58 y.o. female    Chief Complaint: multinodular goiter s/p thyroidectomy, postsurgical hypothyroidism     HPI:   Jhonny Parmar is here for an evaluation of multinodular goiter s/p thyroidectomy, postsurgical hypothyroidism     Her apple watch showed heart rate >200 in reading book, resting. In ER /100's. TSH 0.05, FT4 4.97 - Aug 2021     CT neck - Oct 2020  Multinodular goiter. On the left, dominant nodule measures 2.6 x 3.0 cm mid to lower pole Upper pole nodule measures 17 x 14 mm  Right thyroid nodule measures 17 x 19 x 27 mm. Left lobe is larger than the right with mild substernal extension and tracheal deviation to the right. Repeat TFTs normal - Sep 2021     Saw Dr. Cherrie Payton for goiter causing tracheal deviation   Toxic Multinodular Goiter: s/p total thyroidectomy on 11/3/21. Pathology showed nodular hyperplasia with expanded colloid follicles and adenomatoid nodules, no evidence of malignancy. Post operative PTH 39, Ca 8.9. Menopause around 62     Family history of thyroid disorder: sister has thyroidectomy (no cancer). Mother had thyroid disease. No neck radiation  No Recent iodine loading in form of contrast material for diagnostic studies/cardiac cath    Currently on     Levothyroxine 100 mcg daily in am with water and waits for 60 minutes. Energy levels are good    Weight is down 5 lbs. She is working on weight. She is more active. eating more proteins. Skin is dry      No heat or cold intolerance    No palpitations    Denies depression or anxiety ,tremors, sleep issues   No neck pain, she has compressive neck symptoms for 2-3 years     Taking zyrtec  Off Biotin 5000 mcg daily since surgery.  No other food supplements, herbs or medications including Biotin  No Recent URTI      The following portions of the patient's history were reviewed and updated as appropriate:       Family History   Problem Relation Age of Onset    Diabetes Maternal Aunt

## 2023-07-14 ENCOUNTER — OFFICE VISIT (OUTPATIENT)
Dept: PRIMARY CARE CLINIC | Age: 63
End: 2023-07-14

## 2023-07-14 VITALS
OXYGEN SATURATION: 97 % | RESPIRATION RATE: 16 BRPM | HEIGHT: 65 IN | DIASTOLIC BLOOD PRESSURE: 88 MMHG | TEMPERATURE: 97.3 F | WEIGHT: 224 LBS | HEART RATE: 85 BPM | SYSTOLIC BLOOD PRESSURE: 126 MMHG | BODY MASS INDEX: 37.32 KG/M2

## 2023-07-14 DIAGNOSIS — M62.838 MUSCLE SPASM: Primary | ICD-10-CM

## 2023-07-14 DIAGNOSIS — M54.9 UPPER BACK PAIN ON LEFT SIDE: ICD-10-CM

## 2023-07-14 DIAGNOSIS — M99.01 SOMATIC DYSFUNCTION OF CERVICAL REGION: ICD-10-CM

## 2023-07-14 DIAGNOSIS — M99.02 SOMATIC DYSFUNCTION OF THORACIC REGION: ICD-10-CM

## 2023-07-14 RX ORDER — TIZANIDINE 2 MG/1
2 TABLET ORAL 3 TIMES DAILY PRN
Qty: 30 TABLET | Refills: 0 | Status: SHIPPED | OUTPATIENT
Start: 2023-07-14 | End: 2023-07-24

## 2023-07-14 ASSESSMENT — PATIENT HEALTH QUESTIONNAIRE - PHQ9
SUM OF ALL RESPONSES TO PHQ QUESTIONS 1-9: 0
SUM OF ALL RESPONSES TO PHQ QUESTIONS 1-9: 0
1. LITTLE INTEREST OR PLEASURE IN DOING THINGS: 0
SUM OF ALL RESPONSES TO PHQ9 QUESTIONS 1 & 2: 0
SUM OF ALL RESPONSES TO PHQ QUESTIONS 1-9: 0
SUM OF ALL RESPONSES TO PHQ QUESTIONS 1-9: 0
2. FEELING DOWN, DEPRESSED OR HOPELESS: 0

## 2023-08-03 PROBLEM — E66.812 CLASS 2 SEVERE OBESITY DUE TO EXCESS CALORIES WITH SERIOUS COMORBIDITY AND BODY MASS INDEX (BMI) OF 38.0 TO 38.9 IN ADULT: Chronic | Status: ACTIVE | Noted: 2023-08-03

## 2023-08-03 PROBLEM — I10 ESSENTIAL HYPERTENSION: Chronic | Status: ACTIVE | Noted: 2022-09-15

## 2023-08-03 PROBLEM — J45.909 ASTHMA: Chronic | Status: ACTIVE | Noted: 2021-10-20

## 2023-08-03 PROBLEM — E66.01 CLASS 2 SEVERE OBESITY DUE TO EXCESS CALORIES WITH SERIOUS COMORBIDITY AND BODY MASS INDEX (BMI) OF 38.0 TO 38.9 IN ADULT (HCC): Chronic | Status: ACTIVE | Noted: 2023-08-03

## 2023-08-03 PROBLEM — E78.2 MIXED HYPERLIPIDEMIA: Chronic | Status: ACTIVE | Noted: 2023-04-20

## 2023-08-04 ENCOUNTER — TELEPHONE (OUTPATIENT)
Dept: SLEEP CENTER | Age: 63
End: 2023-08-04

## 2023-08-07 ENCOUNTER — TELEPHONE (OUTPATIENT)
Dept: SLEEP CENTER | Age: 63
End: 2023-08-07

## 2023-08-07 NOTE — TELEPHONE ENCOUNTER
Pt called back   Sent this information to pre-cert:     [facility]   Phone # [phone]  Address to submit: [address]   [place], [address] [place]    Member ID: [id number]

## 2023-08-07 NOTE — TELEPHONE ENCOUNTER
Yolie Doctor 1960 scheduled 38/79/8463, medical policy [id number] with [facility] termed on 07/31/2022. Per the rep she does not see a new policy for this patient. Called pt to find out if she has new insurance.    Left vm for the pt to return my call

## 2023-08-21 ENCOUNTER — HOSPITAL ENCOUNTER (OUTPATIENT)
Dept: SLEEP CENTER | Age: 63
Discharge: HOME | End: 2023-08-21
Payer: COMMERCIAL

## 2023-08-21 DIAGNOSIS — G47.33 OBSTRUCTIVE SLEEP APNEA SYNDROME: ICD-10-CM

## 2023-08-21 PROCEDURE — 95811 POLYSOM 6/>YRS CPAP 4/> PARM: CPT

## 2023-08-24 ENCOUNTER — TELEPHONE (OUTPATIENT)
Dept: PULMONOLOGY | Age: 63
End: 2023-08-24

## 2023-08-24 NOTE — TELEPHONE ENCOUNTER
Pt called in asking to speak with [facility]. Attempted to transfer call to so.

## 2023-08-28 ENCOUNTER — TELEPHONE (OUTPATIENT)
Dept: PULMONOLOGY | Age: 63
End: 2023-08-28

## 2023-08-28 NOTE — TELEPHONE ENCOUNTER
Pt returning call to review titration study. Order to be sent to [facility]. Pt to schedule f/u.  Copy of study mailed to pt per her request.

## 2023-10-24 ENCOUNTER — TELEPHONE (OUTPATIENT)
Dept: PULMONOLOGY | Age: 63
End: 2023-10-24

## 2023-10-24 NOTE — TELEPHONE ENCOUNTER
Pt called regarding her insurance never getting her SS results. Insurance in pt's chart was incorrect and wrote down her correct coverage to switch over and have SS sent to.

## 2024-03-07 ENCOUNTER — HOSPITAL ENCOUNTER (OUTPATIENT)
Dept: GENERAL RADIOLOGY | Age: 64
Discharge: HOME | End: 2024-03-07
Payer: COMMERCIAL

## 2024-03-07 ENCOUNTER — OFFICE VISIT (OUTPATIENT)
Dept: PRIMARY CARE CLINIC | Age: 64
End: 2024-03-07
Payer: COMMERCIAL

## 2024-03-07 VITALS
SYSTOLIC BLOOD PRESSURE: 116 MMHG | DIASTOLIC BLOOD PRESSURE: 76 MMHG | BODY MASS INDEX: 32.02 KG/M2 | TEMPERATURE: 97.6 F | OXYGEN SATURATION: 99 % | RESPIRATION RATE: 16 BRPM | WEIGHT: 192.2 LBS | HEART RATE: 85 BPM | HEIGHT: 65 IN

## 2024-03-07 DIAGNOSIS — R00.2 PALPITATIONS: ICD-10-CM

## 2024-03-07 DIAGNOSIS — M25.562 PAIN IN BOTH KNEES, UNSPECIFIED CHRONICITY: ICD-10-CM

## 2024-03-07 DIAGNOSIS — G47.33 OBSTRUCTIVE SLEEP APNEA: ICD-10-CM

## 2024-03-07 DIAGNOSIS — E78.2 MIXED HYPERLIPIDEMIA: ICD-10-CM

## 2024-03-07 DIAGNOSIS — M25.561 PAIN IN BOTH KNEES, UNSPECIFIED CHRONICITY: ICD-10-CM

## 2024-03-07 DIAGNOSIS — M25.522 LEFT ELBOW PAIN: ICD-10-CM

## 2024-03-07 DIAGNOSIS — Z00.00 ENCOUNTER FOR WELL ADULT EXAM WITHOUT ABNORMAL FINDINGS: Primary | ICD-10-CM

## 2024-03-07 DIAGNOSIS — Z23 NEED FOR PNEUMOCOCCAL VACCINE: ICD-10-CM

## 2024-03-07 DIAGNOSIS — R31.29 MICROSCOPIC HEMATURIA: ICD-10-CM

## 2024-03-07 DIAGNOSIS — Z00.00 ENCOUNTER FOR WELL ADULT EXAM WITHOUT ABNORMAL FINDINGS: ICD-10-CM

## 2024-03-07 DIAGNOSIS — Z12.31 ENCOUNTER FOR SCREENING MAMMOGRAM FOR BREAST CANCER: ICD-10-CM

## 2024-03-07 DIAGNOSIS — R73.03 PREDIABETES: ICD-10-CM

## 2024-03-07 DIAGNOSIS — I10 ESSENTIAL HYPERTENSION: ICD-10-CM

## 2024-03-07 LAB
BASOPHILS # BLD: 0.1 K/UL (ref 0–0.2)
BASOPHILS NFR BLD: 1.3 %
BILIRUBIN, POC: NORMAL
BLOOD URINE, POC: NORMAL
CLARITY, POC: CLEAR
COLOR, POC: YELLOW
DEPRECATED RDW RBC AUTO: 13.8 % (ref 12.4–15.4)
EOSINOPHIL # BLD: 0.1 K/UL (ref 0–0.6)
EOSINOPHIL NFR BLD: 1.9 %
GLUCOSE URINE, POC: NORMAL
HCT VFR BLD AUTO: 42.6 % (ref 36–48)
HGB BLD-MCNC: 14.5 G/DL (ref 12–16)
KETONES, POC: NORMAL
LEUKOCYTE EST, POC: NORMAL
LYMPHOCYTES # BLD: 1.2 K/UL (ref 1–5.1)
LYMPHOCYTES NFR BLD: 26.4 %
MCH RBC QN AUTO: 32.2 PG (ref 26–34)
MCHC RBC AUTO-ENTMCNC: 34 G/DL (ref 31–36)
MCV RBC AUTO: 94.6 FL (ref 80–100)
MONOCYTES # BLD: 0.2 K/UL (ref 0–1.3)
MONOCYTES NFR BLD: 5.2 %
NEUTROPHILS # BLD: 3.1 K/UL (ref 1.7–7.7)
NEUTROPHILS NFR BLD: 65.2 %
NITRITE, POC: NORMAL
PH, POC: 5.5
PLATELET # BLD AUTO: 289 K/UL (ref 135–450)
PMV BLD AUTO: 9.4 FL (ref 5–10.5)
PROTEIN, POC: NORMAL
RBC # BLD AUTO: 4.5 M/UL (ref 4–5.2)
SPECIFIC GRAVITY, POC: >=1.03
UROBILINOGEN, POC: 0.2
WBC # BLD AUTO: 4.7 K/UL (ref 4–11)

## 2024-03-07 PROCEDURE — 3078F DIAST BP <80 MM HG: CPT | Performed by: INTERNAL MEDICINE

## 2024-03-07 PROCEDURE — 73080 X-RAY EXAM OF ELBOW: CPT

## 2024-03-07 PROCEDURE — 90471 IMMUNIZATION ADMIN: CPT | Performed by: INTERNAL MEDICINE

## 2024-03-07 PROCEDURE — 3074F SYST BP LT 130 MM HG: CPT | Performed by: INTERNAL MEDICINE

## 2024-03-07 PROCEDURE — G8484 FLU IMMUNIZE NO ADMIN: HCPCS | Performed by: INTERNAL MEDICINE

## 2024-03-07 PROCEDURE — 73562 X-RAY EXAM OF KNEE 3: CPT

## 2024-03-07 PROCEDURE — 93000 ELECTROCARDIOGRAM COMPLETE: CPT | Performed by: INTERNAL MEDICINE

## 2024-03-07 PROCEDURE — 90677 PCV20 VACCINE IM: CPT | Performed by: INTERNAL MEDICINE

## 2024-03-07 PROCEDURE — 81002 URINALYSIS NONAUTO W/O SCOPE: CPT | Performed by: INTERNAL MEDICINE

## 2024-03-07 PROCEDURE — 99396 PREV VISIT EST AGE 40-64: CPT | Performed by: INTERNAL MEDICINE

## 2024-03-07 RX ORDER — MELOXICAM 7.5 MG/1
7.5 TABLET ORAL DAILY
Qty: 30 TABLET | Refills: 0 | Status: SHIPPED | OUTPATIENT
Start: 2024-03-07

## 2024-03-07 SDOH — ECONOMIC STABILITY: FOOD INSECURITY: WITHIN THE PAST 12 MONTHS, THE FOOD YOU BOUGHT JUST DIDN'T LAST AND YOU DIDN'T HAVE MONEY TO GET MORE.: NEVER TRUE

## 2024-03-07 SDOH — ECONOMIC STABILITY: INCOME INSECURITY: HOW HARD IS IT FOR YOU TO PAY FOR THE VERY BASICS LIKE FOOD, HOUSING, MEDICAL CARE, AND HEATING?: NOT HARD AT ALL

## 2024-03-07 SDOH — ECONOMIC STABILITY: FOOD INSECURITY: WITHIN THE PAST 12 MONTHS, YOU WORRIED THAT YOUR FOOD WOULD RUN OUT BEFORE YOU GOT MONEY TO BUY MORE.: NEVER TRUE

## 2024-03-07 ASSESSMENT — PATIENT HEALTH QUESTIONNAIRE - PHQ9
SUM OF ALL RESPONSES TO PHQ9 QUESTIONS 1 & 2: 0
1. LITTLE INTEREST OR PLEASURE IN DOING THINGS: 0
SUM OF ALL RESPONSES TO PHQ QUESTIONS 1-9: 0
2. FEELING DOWN, DEPRESSED OR HOPELESS: 0

## 2024-03-07 NOTE — PROGRESS NOTES
Well Adult Note  Name: Milka Harden Today’s Date: 3/7/2024   MRN: [id number] Sex: Female   Age: 63 y.o. Ethnicity: Non- / Non    : 1960 Race: Black /       Chief Complaint   Patient presents with    Annual Exam       Milka Harden is here for well adult exam.  History:  Patient presents for annual physical exam. Pap smear done 23. Mammogram done on 21.    Patient has hypertension. Patient has been off HCTZ since summer. Patient monitors her BP at home and it averages 130/82.     Patient has hyperlipidemia. Patient decreases fat and cholesterol.     Patient has prediabetes. Patient decreases carbohydrates. Patient states she tries to exercise 4 times per week. Patient does walking and weight lifting.     Patient has TRAY. Patient states she not doing CPAP.    Patient states she did a diet called 40 Day Turn Up in October. Patient states she did a diet detox for 2 days. Patient states then she ate a protein, carb, fat, and under 400 calories for 4 meals per day for 40 days. Patient states she did clean eating and exercised for 60 minutes 4 days per week. Patient states she increased her water to  ounces per day.    Patient states she has heart palpitations every now and then. Patient states she notices them after she settles down and gets into bed. Patient states she drinks 1 cup of coffee per day. Patient does not take decongestants but takes Coricidin or NyQuil HBP.    Patient states both knees are painful. Patient denies swelling. Patient states she has arthritis in her right knee and it is bone on bone. Patient states she felt a pull in her left knee when she got out of bed 2 weeks ago. Knee pain is dull achy.    Patient complains of left elbow pain for a while. Patient states elbow pain is dull achy. Patient states she may have hit her elbow.      Review of Systems   Constitutional:  Positive for activity change (increase) and unexpected weight change.

## 2024-03-07 NOTE — PATIENT INSTRUCTIONS
help you develop a safe and effective exercise program.  A counselor or psychiatrist can help you cope with issues such as depression, anxiety, or family problems that can make it hard to focus on weight loss.  Consider joining a support group for people who are trying to lose weight. Your doctor can suggest groups in your area.  Where can you learn more?  Go to https://www.[employer].com.net/patientEd and enter U357 to learn more about \"Starting a Weight Loss Plan: Care Instructions.\"  Current as of: September 20, 2023               Content Version: 14.0  © 2501-0409 [employer].   Care instructions adapted under license by "[employer]. If you have questions about a medical condition or this instruction, always ask your healthcare professional. [employer] disclaims any warranty or liability for your use of this information.           Well Visit, Ages 18 to 65: Care Instructions  Well visits can help you stay healthy. Your doctor has checked your overall health and may have suggested ways to take good care of yourself. Your doctor also may have recommended tests. You can help prevent illness with healthy eating, good sleep, vaccinations, regular exercise, and other steps.    Get the tests that you and your doctor decide on. Depending on your age and risks, examples might include screening for diabetes; hepatitis C; HIV; and cervical, breast, lung, and colon cancer. Screening helps find diseases before any symptoms appear.   Eat healthy foods. Choose fruits, vegetables, whole grains, lean protein, and low-fat dairy foods. Limit saturated fat and reduce salt.     Limit alcohol. Men should have no more than 2 drinks a day. Women should have no more than 1. For some people, no alcohol is the best choice.   Exercise. Get at least 30 minutes of exercise on most days of the week. Walking can be a good choice.     Reach and stay at your healthy weight. This will lower your risk for many health

## 2024-03-08 LAB
ALBUMIN SERPL-MCNC: 4.6 G/DL (ref 3.4–5)
ALBUMIN/GLOB SERPL: 1.6 {RATIO} (ref 1.1–2.2)
ALP SERPL-CCNC: 101 U/L (ref 40–129)
ALT SERPL-CCNC: 9 U/L (ref 10–40)
ANION GAP SERPL CALCULATED.3IONS-SCNC: 14 MMOL/L (ref 3–16)
AST SERPL-CCNC: 13 U/L (ref 15–37)
BACTERIA UR CULT: NORMAL
BILIRUB SERPL-MCNC: <0.2 MG/DL (ref 0–1)
BUN SERPL-MCNC: 10 MG/DL (ref 7–20)
CALCIUM SERPL-MCNC: 9.7 MG/DL (ref 8.3–10.6)
CHLORIDE SERPL-SCNC: 102 MMOL/L (ref 99–110)
CHOLEST SERPL-MCNC: 225 MG/DL (ref 0–199)
CO2 SERPL-SCNC: 25 MMOL/L (ref 21–32)
CREAT SERPL-MCNC: 0.7 MG/DL (ref 0.6–1.2)
EST. AVERAGE GLUCOSE BLD GHB EST-MCNC: 105.4 MG/DL
GFR SERPLBLD CREATININE-BSD FMLA CKD-EPI: >60 ML/MIN/{1.73_M2}
GLUCOSE SERPL-MCNC: 94 MG/DL (ref 70–99)
HBA1C MFR BLD: 5.3 %
HDLC SERPL-MCNC: 84 MG/DL (ref 40–60)
LDLC SERPL CALC-MCNC: 125 MG/DL
POTASSIUM SERPL-SCNC: 3.8 MMOL/L (ref 3.5–5.1)
PROT SERPL-MCNC: 7.4 G/DL (ref 6.4–8.2)
SODIUM SERPL-SCNC: 141 MMOL/L (ref 136–145)
TRIGL SERPL-MCNC: 81 MG/DL (ref 0–150)
TSH SERPL DL<=0.005 MIU/L-ACNC: 0.18 UIU/ML (ref 0.27–4.2)
VLDLC SERPL CALC-MCNC: 16 MG/DL

## 2024-03-16 PROBLEM — M25.561 PAIN IN BOTH KNEES: Status: ACTIVE | Noted: 2024-03-16

## 2024-03-16 PROBLEM — M25.522 LEFT ELBOW PAIN: Status: ACTIVE | Noted: 2024-03-16

## 2024-03-16 PROBLEM — M25.562 PAIN IN BOTH KNEES: Status: ACTIVE | Noted: 2024-03-16

## 2024-03-16 PROBLEM — R00.2 PALPITATIONS: Status: ACTIVE | Noted: 2024-03-16

## 2024-03-22 ENCOUNTER — OFFICE VISIT (OUTPATIENT)
Dept: ORTHOPEDIC SURGERY | Age: 64
End: 2024-03-22
Payer: COMMERCIAL

## 2024-03-22 VITALS — HEIGHT: 65 IN | WEIGHT: 188 LBS | BODY MASS INDEX: 31.32 KG/M2

## 2024-03-22 DIAGNOSIS — M77.12 LATERAL EPICONDYLITIS OF LEFT ELBOW: ICD-10-CM

## 2024-03-22 DIAGNOSIS — M17.11 PATELLOFEMORAL ARTHRITIS OF RIGHT KNEE: ICD-10-CM

## 2024-03-22 DIAGNOSIS — M17.12 PATELLOFEMORAL ARTHRITIS OF LEFT KNEE: Primary | ICD-10-CM

## 2024-03-22 PROCEDURE — 1036F TOBACCO NON-USER: CPT | Performed by: ORTHOPAEDIC SURGERY

## 2024-03-22 PROCEDURE — G8427 DOCREV CUR MEDS BY ELIG CLIN: HCPCS | Performed by: ORTHOPAEDIC SURGERY

## 2024-03-22 PROCEDURE — 99204 OFFICE O/P NEW MOD 45 MIN: CPT | Performed by: ORTHOPAEDIC SURGERY

## 2024-03-22 PROCEDURE — G8417 CALC BMI ABV UP PARAM F/U: HCPCS | Performed by: ORTHOPAEDIC SURGERY

## 2024-03-22 PROCEDURE — G8484 FLU IMMUNIZE NO ADMIN: HCPCS | Performed by: ORTHOPAEDIC SURGERY

## 2024-03-22 PROCEDURE — 3017F COLORECTAL CA SCREEN DOC REV: CPT | Performed by: ORTHOPAEDIC SURGERY

## 2024-03-22 NOTE — PROGRESS NOTES
Date:  3/22/2024    Name:  Milka Harden  Address:  [address] [address]    :  1960      Age:   63 y.o.    SSN:        Medical Record Number:  [id number]    Reason for Visit:    Chief Complaint    New Patient (Bilateral knee and left elbow pain)      DOS:3/22/2024     HPI: Milka Harden is a 63 y.o. female here today for evaluation of bilateral knee pain. Her right knee pain has been ongoing for years but the left knee she states she felt a pop 2 months ago. She presents today complaining of anterior pain in bilateral knees exacerbated with stairs or certain movements. Denies catching, locking, instability. She has had previous corticosteroid injections in right knee several years ago but has had no formal treatment for the left knee.    She also complains of lateral sided left elbow pain today ongoing for 1 year. She denies any specific aggravating factors. Pain is intermittent, most recently 2 weeks ago and lasts for about 3-4 days. Denies numbness or tingling. She has not had any former treatment for this issue.        Pain Assessment  Location of Pain: Knee  Location Modifiers: Left, Right  Quality of Pain: Sharp, Dull  Duration of Pain: Persistent  Frequency of Pain: Intermittent  Aggravating Factors: Stairs (twisting)  Limiting Behavior: No  Relieving Factors: Rest  Result of Injury: No  Work-Related Injury: No  Are there other pain locations you wish to document?: Yes (elbow)  ROS: Review of systems reviewed from Patient History Form completed today and available in the patient's chart under the Media tab.     Past Medical History:   Diagnosis Date    Allergic rhinitis     Asthma 10/20/2021    Dizziness     Essential hypertension 9/15/2022    GERD (gastroesophageal reflux disease)     Goiter     Nonintractable headache 2022    Osteoarthritis     Right ankle effusion     2014    Sinusitis     Sleep apnea     Tinnitus     TMJ dysfunction     Visual impairment

## 2024-08-12 DIAGNOSIS — E89.0 POST-SURGICAL HYPOTHYROIDISM: ICD-10-CM

## 2024-08-12 RX ORDER — LEVOTHYROXINE SODIUM 0.1 MG/1
100 TABLET ORAL DAILY
Qty: 90 TABLET | Refills: 3 | OUTPATIENT
Start: 2024-08-12

## 2024-08-15 ENCOUNTER — OFFICE VISIT (OUTPATIENT)
Dept: ENDOCRINOLOGY | Age: 64
End: 2024-08-15
Payer: COMMERCIAL

## 2024-08-15 VITALS
WEIGHT: 188.4 LBS | DIASTOLIC BLOOD PRESSURE: 82 MMHG | OXYGEN SATURATION: 98 % | HEIGHT: 65 IN | BODY MASS INDEX: 31.39 KG/M2 | HEART RATE: 74 BPM | SYSTOLIC BLOOD PRESSURE: 139 MMHG

## 2024-08-15 DIAGNOSIS — E55.9 VITAMIN D DEFICIENCY: ICD-10-CM

## 2024-08-15 DIAGNOSIS — E89.0 POST-SURGICAL HYPOTHYROIDISM: Primary | ICD-10-CM

## 2024-08-15 PROCEDURE — 99214 OFFICE O/P EST MOD 30 MIN: CPT | Performed by: INTERNAL MEDICINE

## 2024-08-15 PROCEDURE — 1036F TOBACCO NON-USER: CPT | Performed by: INTERNAL MEDICINE

## 2024-08-15 PROCEDURE — G8417 CALC BMI ABV UP PARAM F/U: HCPCS | Performed by: INTERNAL MEDICINE

## 2024-08-15 PROCEDURE — 3079F DIAST BP 80-89 MM HG: CPT | Performed by: INTERNAL MEDICINE

## 2024-08-15 PROCEDURE — G8427 DOCREV CUR MEDS BY ELIG CLIN: HCPCS | Performed by: INTERNAL MEDICINE

## 2024-08-15 PROCEDURE — 3017F COLORECTAL CA SCREEN DOC REV: CPT | Performed by: INTERNAL MEDICINE

## 2024-08-15 PROCEDURE — 3075F SYST BP GE 130 - 139MM HG: CPT | Performed by: INTERNAL MEDICINE

## 2024-08-15 RX ORDER — LEVOTHYROXINE SODIUM 88 UG/1
88 TABLET ORAL DAILY
Qty: 90 TABLET | Refills: 2 | Status: SHIPPED | OUTPATIENT
Start: 2024-08-15

## 2024-08-15 NOTE — PROGRESS NOTES
itching. Normal nails.   Neurological: Negative for seizures, light-headedness, numbness and headaches.   Hematological/ Lymph nodes: Negative for adenopathy. Does not bruise/bleed easily.   Psychiatric/Behavioral: Negative for suicidal ideas and decreased concentration.          Physical Exam:  /82 (Site: Right Upper Arm, Position: Sitting, Cuff Size: Large Adult)   Pulse 74   Ht 1.638 m (5' 4.5\")   Wt 85.5 kg (188 lb 6.4 oz)   LMP 09/21/2016   SpO2 98%   BMI 31.84 kg/m²   Constitutional: Patient is oriented to person, place, and time. Patient appears well-developed and well-nourished.   HENT:    Head: Normocephalic and atraumatic.     Eyes: Conjunctivae and EOM are normal.    Neck: Normal range of motion. Thyroid absent, scar present in the crease.    Cardiovascular: Normal rate, regular rhythm and normal heart sounds.    Pulmonary/Chest: Effort normal and breath sounds normal.   Musculoskeletal: Normal range of motion.   Neurological: Patient is alert and oriented to person, place, and time. Patient has slow reflexes. No hand tremors.   Skin: Skin is warm and dry.   Psychiatric: Patient has a normal mood and affect. Patient behavior is normal.     Lab Review:    Orders Only on 03/07/2024   Component Date Value Ref Range Status    WBC 03/07/2024 4.7  4.0 - 11.0 K/uL Final    RBC 03/07/2024 4.50  4.00 - 5.20 M/uL Final    Hemoglobin 03/07/2024 14.5  12.0 - 16.0 g/dL Final    Hematocrit 03/07/2024 42.6  36.0 - 48.0 % Final    MCV 03/07/2024 94.6  80.0 - 100.0 fL Final    MCH 03/07/2024 32.2  26.0 - 34.0 pg Final    MCHC 03/07/2024 34.0  31.0 - 36.0 g/dL Final    RDW 03/07/2024 13.8  12.4 - 15.4 % Final    Platelets 03/07/2024 289  135 - 450 K/uL Final    MPV 03/07/2024 9.4  5.0 - 10.5 fL Final    Neutrophils % 03/07/2024 65.2  % Final    Lymphocytes % 03/07/2024 26.4  % Final    Monocytes % 03/07/2024 5.2  % Final    Eosinophils % 03/07/2024 1.9  % Final    Basophils % 03/07/2024 1.3  % Final

## 2024-09-16 ENCOUNTER — TELEPHONE (OUTPATIENT)
Dept: PRIMARY CARE CLINIC | Age: 64
End: 2024-09-16

## 2024-11-14 ENCOUNTER — HOSPITAL ENCOUNTER (OUTPATIENT)
Dept: MAMMOGRAPHY | Age: 64
Discharge: HOME | End: 2024-11-14
Payer: COMMERCIAL

## 2024-11-14 VITALS — HEIGHT: 65 IN | WEIGHT: 189 LBS | BODY MASS INDEX: 31.49 KG/M2

## 2024-11-14 DIAGNOSIS — Z12.31 ENCOUNTER FOR SCREENING MAMMOGRAM FOR BREAST CANCER: ICD-10-CM

## 2024-11-14 PROCEDURE — 77067 SCR MAMMO BI INCL CAD: CPT

## 2024-11-20 ENCOUNTER — TELEPHONE (OUTPATIENT)
Dept: WOMENS IMAGING | Age: 64
End: 2024-11-20

## 2024-12-05 ENCOUNTER — HOSPITAL ENCOUNTER (OUTPATIENT)
Dept: WOMENS IMAGING | Age: 64
Discharge: HOME | End: 2024-12-05
Payer: COMMERCIAL

## 2024-12-05 ENCOUNTER — HOSPITAL ENCOUNTER (OUTPATIENT)
Dept: ULTRASOUND IMAGING | Age: 64
Discharge: HOME | End: 2024-12-05
Payer: COMMERCIAL

## 2024-12-05 DIAGNOSIS — R92.8 ABNORMAL MAMMOGRAM: ICD-10-CM

## 2024-12-05 PROCEDURE — 76642 ULTRASOUND BREAST LIMITED: CPT

## 2024-12-05 PROCEDURE — G0279 TOMOSYNTHESIS, MAMMO: HCPCS

## 2024-12-13 ENCOUNTER — TELEPHONE (OUTPATIENT)
Dept: PRIMARY CARE CLINIC | Age: 64
End: 2024-12-13

## 2024-12-13 ENCOUNTER — E-VISIT (OUTPATIENT)
Dept: PRIMARY CARE CLINIC | Age: 64
End: 2024-12-13

## 2024-12-13 DIAGNOSIS — J32.9 SINUSITIS, UNSPECIFIED CHRONICITY, UNSPECIFIED LOCATION: Primary | ICD-10-CM

## 2024-12-13 RX ORDER — GUAIFENESIN, PSEUDOEPHEDRINE HYDROCHLORIDE 600; 60 MG/1; MG/1
1 TABLET, EXTENDED RELEASE ORAL 2 TIMES DAILY PRN
Qty: 20 TABLET | Refills: 0 | Status: SHIPPED | OUTPATIENT
Start: 2024-12-13

## 2024-12-13 RX ORDER — AMOXICILLIN 875 MG/1
875 TABLET, COATED ORAL 2 TIMES DAILY
Qty: 20 TABLET | Refills: 0 | Status: SHIPPED | OUTPATIENT
Start: 2024-12-13 | End: 2024-12-23

## 2024-12-13 RX ORDER — FLUTICASONE PROPIONATE 50 MCG
2 SPRAY, SUSPENSION (ML) NASAL DAILY
Qty: 16 G | Refills: 0 | Status: SHIPPED | OUTPATIENT
Start: 2024-12-13

## 2024-12-13 ASSESSMENT — LIFESTYLE VARIABLES: SMOKING_STATUS: NO, I'VE NEVER SMOKED

## 2024-12-13 NOTE — TELEPHONE ENCOUNTER
Spoke with patient, notified her that the office and the overflow office is booked with no availability. Recommended patient go to urgent care or submit an E visit questionnaire online for treatment. Also offered her an appointment on Monday if she would like. Patient agreed to submit an Evisit through my chart.

## 2024-12-13 NOTE — TELEPHONE ENCOUNTER
Medication Request  Patient has persistent nasal congestion & cough symptoms   Patient requesting a medication for relief  *Patient has been taking DayQuil & NightQuil    Please send to:  [facility]/pharmacy #[id number] - [facility] [address] [facility] [address] [phone] -  [phone]

## 2024-12-13 NOTE — PROGRESS NOTES
Milka Harden (1960) initiated an asynchronous digital communication through [employer].    HPI: per patient questionnaire     Exam: not applicable    Diagnoses and all orders for this visit:    1. Sinusitis, unspecified chronicity, unspecified location  Assessment & Plan:  -Amoxicillin 875mg 2 times daily for 10 days   -Mucinex D 1 tablet 2 times daily as needed  -Flonase nasal spray 2 sprays each nostril once daily   -stop over the counter cold medications, Nyquil, and Dayquil    Orders:  -     amoxicillin (AMOXIL) 875 MG tablet; Take 1 tablet by mouth 2 times daily for 10 days, Disp-20 tablet, R-0Normal  -     pseudoephedrine-guaiFENesin (MUCINEX D)  MG per extended release tablet; Take 1 tablet by mouth 2 times daily as needed for Congestion, Disp-20 tablet, R-0Normal  -     fluticasone (FLONASE) 50 MCG/ACT nasal spray; 2 sprays by Each Nostril route daily, Disp-16 g, R-0Normal            Time: EV1 - 5-10 minutes were spent on the digital evaluation and management of this patient.    Osmani Lagunas MD

## 2024-12-13 NOTE — ASSESSMENT & PLAN NOTE
-Amoxicillin 875mg 2 times daily for 10 days   -Mucinex D 1 tablet 2 times daily as needed  -Flonase nasal spray 2 sprays each nostril once daily   -stop over the counter cold medications, Nyquil, and Dayquil

## 2025-01-12 PROBLEM — J32.9 SINUSITIS: Status: RESOLVED | Noted: 2024-12-13 | Resolved: 2025-01-12

## 2025-02-07 DIAGNOSIS — E55.9 VITAMIN D DEFICIENCY: ICD-10-CM

## 2025-02-07 DIAGNOSIS — E89.0 POST-SURGICAL HYPOTHYROIDISM: ICD-10-CM

## 2025-02-07 LAB
25(OH)D3 SERPL-MCNC: 40.5 NG/ML
T4 FREE SERPL-MCNC: 1.5 NG/DL (ref 0.9–1.8)
TSH SERPL DL<=0.005 MIU/L-ACNC: 1.05 UIU/ML (ref 0.27–4.2)

## 2025-02-13 ENCOUNTER — OFFICE VISIT (OUTPATIENT)
Dept: ENDOCRINOLOGY | Age: 65
End: 2025-02-13
Payer: COMMERCIAL

## 2025-02-13 VITALS
HEIGHT: 65 IN | SYSTOLIC BLOOD PRESSURE: 128 MMHG | BODY MASS INDEX: 32.09 KG/M2 | DIASTOLIC BLOOD PRESSURE: 87 MMHG | WEIGHT: 192.6 LBS | HEART RATE: 76 BPM | OXYGEN SATURATION: 100 %

## 2025-02-13 DIAGNOSIS — E89.0 POST-SURGICAL HYPOTHYROIDISM: ICD-10-CM

## 2025-02-13 PROCEDURE — 3017F COLORECTAL CA SCREEN DOC REV: CPT | Performed by: INTERNAL MEDICINE

## 2025-02-13 PROCEDURE — 3079F DIAST BP 80-89 MM HG: CPT | Performed by: INTERNAL MEDICINE

## 2025-02-13 PROCEDURE — G8417 CALC BMI ABV UP PARAM F/U: HCPCS | Performed by: INTERNAL MEDICINE

## 2025-02-13 PROCEDURE — 99214 OFFICE O/P EST MOD 30 MIN: CPT | Performed by: INTERNAL MEDICINE

## 2025-02-13 PROCEDURE — 3074F SYST BP LT 130 MM HG: CPT | Performed by: INTERNAL MEDICINE

## 2025-02-13 PROCEDURE — 1036F TOBACCO NON-USER: CPT | Performed by: INTERNAL MEDICINE

## 2025-02-13 PROCEDURE — G8427 DOCREV CUR MEDS BY ELIG CLIN: HCPCS | Performed by: INTERNAL MEDICINE

## 2025-02-13 RX ORDER — LEVOTHYROXINE SODIUM 88 UG/1
88 TABLET ORAL DAILY
Qty: 90 TABLET | Refills: 3 | Status: SHIPPED | OUTPATIENT
Start: 2025-02-13

## 2025-02-13 NOTE — PROGRESS NOTES
has lower back pain.  Negative for myalgias,  arthralgias.   Skin/Nail: Negative for rash, itching. Normal nails.   Neurological: Negative for seizures, light-headedness, numbness and headaches.   Hematological/ Lymph nodes: Negative for adenopathy. Does not bruise/bleed easily.   Psychiatric/Behavioral: Negative for suicidal ideas and decreased concentration.          Physical Exam:  /87   Pulse 76   Ht 1.638 m (5' 4.5\")   Wt 87.4 kg (192 lb 9.6 oz)   LMP 09/21/2016   SpO2 100%   BMI 32.55 kg/m²   Constitutional: Patient is oriented to person, place, and time. Patient appears well-developed and well-nourished.   HENT:    Head: Normocephalic and atraumatic.     Eyes: Conjunctivae and EOM are normal.    Neck: Normal range of motion. Thyroid absent, scar present in the crease.    Cardiovascular: Normal rate, regular rhythm and normal heart sounds.    Pulmonary/Chest: Effort normal and breath sounds normal.   Musculoskeletal: Normal range of motion.   Neurological: Patient is alert and oriented to person, place, and time. Patient has slow reflexes. No hand tremors.   Skin: Skin is warm and dry.   Psychiatric: Patient has a normal mood and affect. Patient behavior is normal.     Lab Review:    Orders Only on 02/07/2025   Component Date Value Ref Range Status    Vit D, 25-Hydroxy 02/07/2025 40.5  >=30 ng/mL Final    T4 Free 02/07/2025 1.5  0.9 - 1.8 ng/dL Final    TSH 02/07/2025 1.05  0.27 - 4.20 uIU/mL Final   Orders Only on 03/07/2024   Component Date Value Ref Range Status    WBC 03/07/2024 4.7  4.0 - 11.0 K/uL Final    RBC 03/07/2024 4.50  4.00 - 5.20 M/uL Final    Hemoglobin 03/07/2024 14.5  12.0 - 16.0 g/dL Final    Hematocrit 03/07/2024 42.6  36.0 - 48.0 % Final    MCV 03/07/2024 94.6  80.0 - 100.0 fL Final    MCH 03/07/2024 32.2  26.0 - 34.0 pg Final    MCHC 03/07/2024 34.0  31.0 - 36.0 g/dL Final    RDW 03/07/2024 13.8  12.4 - 15.4 % Final    Platelets 03/07/2024 289  135 - 450 K/uL Final    MPV
